# Patient Record
Sex: FEMALE | Race: WHITE | Employment: OTHER | URBAN - METROPOLITAN AREA
[De-identification: names, ages, dates, MRNs, and addresses within clinical notes are randomized per-mention and may not be internally consistent; named-entity substitution may affect disease eponyms.]

---

## 2019-07-01 ENCOUNTER — HOSPITAL ENCOUNTER (INPATIENT)
Age: 74
LOS: 5 days | Discharge: NURSING, REHAB OR LONG-TERM CARE | DRG: 194 | End: 2019-07-06
Attending: EMERGENCY MEDICINE | Admitting: HOSPITALIST
Payer: COMMERCIAL

## 2019-07-01 ENCOUNTER — APPOINTMENT (OUTPATIENT)
Dept: GENERAL RADIOLOGY | Age: 74
DRG: 194 | End: 2019-07-01
Attending: EMERGENCY MEDICINE
Payer: COMMERCIAL

## 2019-07-01 DIAGNOSIS — R09.02 HYPOXIA: ICD-10-CM

## 2019-07-01 DIAGNOSIS — J44.1 COPD EXACERBATION (HCC): ICD-10-CM

## 2019-07-01 DIAGNOSIS — R77.8 ELEVATED TROPONIN: ICD-10-CM

## 2019-07-01 DIAGNOSIS — I50.9 ACUTE ON CHRONIC CONGESTIVE HEART FAILURE, UNSPECIFIED HEART FAILURE TYPE (HCC): Primary | ICD-10-CM

## 2019-07-01 PROBLEM — Z86.73 HISTORY OF CVA (CEREBROVASCULAR ACCIDENT): Status: ACTIVE | Noted: 2019-07-01

## 2019-07-01 PROBLEM — K21.9 GERD (GASTROESOPHAGEAL REFLUX DISEASE): Status: ACTIVE | Noted: 2019-07-01

## 2019-07-01 PROBLEM — G40.909 SEIZURE DISORDER (HCC): Status: ACTIVE | Noted: 2019-07-01

## 2019-07-01 PROBLEM — I50.43 ACUTE ON CHRONIC COMBINED SYSTOLIC AND DIASTOLIC CHF, NYHA CLASS 4 (HCC): Status: ACTIVE | Noted: 2019-07-01

## 2019-07-01 PROBLEM — Z95.0 PACEMAKER: Status: ACTIVE | Noted: 2019-07-01

## 2019-07-01 LAB
ALBUMIN SERPL-MCNC: 3.3 G/DL (ref 3.4–5)
ALBUMIN/GLOB SERPL: 1.1 {RATIO} (ref 0.8–1.7)
ALP SERPL-CCNC: 105 U/L (ref 45–117)
ALT SERPL-CCNC: 21 U/L (ref 13–56)
ANION GAP SERPL CALC-SCNC: 7 MMOL/L (ref 3–18)
APPEARANCE UR: ABNORMAL
AST SERPL-CCNC: 43 U/L (ref 15–37)
ATRIAL RATE: 89 BPM
BACTERIA URNS QL MICRO: ABNORMAL /HPF
BASOPHILS # BLD: 0 K/UL (ref 0–0.1)
BASOPHILS NFR BLD: 0 % (ref 0–2)
BILIRUB SERPL-MCNC: 1.8 MG/DL (ref 0.2–1)
BILIRUB UR QL: NEGATIVE
BNP SERPL-MCNC: ABNORMAL PG/ML (ref 0–900)
BUN SERPL-MCNC: 47 MG/DL (ref 7–18)
BUN/CREAT SERPL: 36 (ref 12–20)
CALCIUM SERPL-MCNC: 9 MG/DL (ref 8.5–10.1)
CALCULATED P AXIS, ECG09: 42 DEGREES
CALCULATED R AXIS, ECG10: 7 DEGREES
CALCULATED T AXIS, ECG11: 79 DEGREES
CHLORIDE SERPL-SCNC: 91 MMOL/L (ref 100–108)
CK MB CFR SERPL CALC: 2.8 % (ref 0–4)
CK MB SERPL-MCNC: 2.1 NG/ML (ref 5–25)
CK SERPL-CCNC: 76 U/L (ref 26–192)
CO2 SERPL-SCNC: 40 MMOL/L (ref 21–32)
COLOR UR: YELLOW
CREAT SERPL-MCNC: 1.3 MG/DL (ref 0.6–1.3)
DIAGNOSIS, 93000: NORMAL
DIFFERENTIAL METHOD BLD: ABNORMAL
EOSINOPHIL # BLD: 0 K/UL (ref 0–0.4)
EOSINOPHIL NFR BLD: 1 % (ref 0–5)
EPITH CASTS URNS QL MICRO: ABNORMAL /LPF (ref 0–5)
ERYTHROCYTE [DISTWIDTH] IN BLOOD BY AUTOMATED COUNT: 18.3 % (ref 11.6–14.5)
EST. AVERAGE GLUCOSE BLD GHB EST-MCNC: NORMAL MG/DL
GLOBULIN SER CALC-MCNC: 3.1 G/DL (ref 2–4)
GLUCOSE BLD STRIP.AUTO-MCNC: 149 MG/DL (ref 70–110)
GLUCOSE SERPL-MCNC: 78 MG/DL (ref 74–99)
GLUCOSE UR STRIP.AUTO-MCNC: NEGATIVE MG/DL
HBA1C MFR BLD: 4.3 % (ref 4.2–5.6)
HCT VFR BLD AUTO: 30.5 % (ref 35–45)
HGB BLD-MCNC: 9 G/DL (ref 12–16)
HGB UR QL STRIP: ABNORMAL
KETONES UR QL STRIP.AUTO: NEGATIVE MG/DL
LEUKOCYTE ESTERASE UR QL STRIP.AUTO: ABNORMAL
LYMPHOCYTES # BLD: 0.7 K/UL (ref 0.9–3.6)
LYMPHOCYTES NFR BLD: 10 % (ref 21–52)
MCH RBC QN AUTO: 29.9 PG (ref 24–34)
MCHC RBC AUTO-ENTMCNC: 29.5 G/DL (ref 31–37)
MCV RBC AUTO: 101.3 FL (ref 74–97)
MONOCYTES # BLD: 0.7 K/UL (ref 0.05–1.2)
MONOCYTES NFR BLD: 11 % (ref 3–10)
NEUTS SEG # BLD: 5.1 K/UL (ref 1.8–8)
NEUTS SEG NFR BLD: 78 % (ref 40–73)
NITRITE UR QL STRIP.AUTO: NEGATIVE
P-R INTERVAL, ECG05: 188 MS
PH UR STRIP: 7 [PH] (ref 5–8)
PLATELET # BLD AUTO: 134 K/UL (ref 135–420)
PMV BLD AUTO: 10.5 FL (ref 9.2–11.8)
POTASSIUM SERPL-SCNC: 3.7 MMOL/L (ref 3.5–5.5)
PROT SERPL-MCNC: 6.4 G/DL (ref 6.4–8.2)
PROT UR STRIP-MCNC: 30 MG/DL
Q-T INTERVAL, ECG07: 476 MS
QRS DURATION, ECG06: 182 MS
QTC CALCULATION (BEZET), ECG08: 579 MS
RBC # BLD AUTO: 3.01 M/UL (ref 4.2–5.3)
RBC #/AREA URNS HPF: ABNORMAL /HPF (ref 0–5)
SODIUM SERPL-SCNC: 138 MMOL/L (ref 136–145)
SP GR UR REFRACTOMETRY: 1.01 (ref 1–1.03)
TROPONIN I SERPL-MCNC: 0.26 NG/ML (ref 0–0.04)
UROBILINOGEN UR QL STRIP.AUTO: 1 EU/DL (ref 0.2–1)
VENTRICULAR RATE, ECG03: 89 BPM
WBC # BLD AUTO: 6.5 K/UL (ref 4.6–13.2)
WBC URNS QL MICRO: ABNORMAL /HPF (ref 0–5)

## 2019-07-01 PROCEDURE — 74011250637 HC RX REV CODE- 250/637: Performed by: HOSPITALIST

## 2019-07-01 PROCEDURE — 71045 X-RAY EXAM CHEST 1 VIEW: CPT

## 2019-07-01 PROCEDURE — 99285 EMERGENCY DEPT VISIT HI MDM: CPT

## 2019-07-01 PROCEDURE — 93005 ELECTROCARDIOGRAM TRACING: CPT

## 2019-07-01 PROCEDURE — 77030013140 HC MSK NEB VYRM -A

## 2019-07-01 PROCEDURE — 83036 HEMOGLOBIN GLYCOSYLATED A1C: CPT

## 2019-07-01 PROCEDURE — 96375 TX/PRO/DX INJ NEW DRUG ADDON: CPT

## 2019-07-01 PROCEDURE — 74011250636 HC RX REV CODE- 250/636: Performed by: HOSPITALIST

## 2019-07-01 PROCEDURE — 96374 THER/PROPH/DIAG INJ IV PUSH: CPT

## 2019-07-01 PROCEDURE — 94640 AIRWAY INHALATION TREATMENT: CPT

## 2019-07-01 PROCEDURE — 74011250636 HC RX REV CODE- 250/636: Performed by: EMERGENCY MEDICINE

## 2019-07-01 PROCEDURE — 65660000000 HC RM CCU STEPDOWN

## 2019-07-01 PROCEDURE — 83880 ASSAY OF NATRIURETIC PEPTIDE: CPT

## 2019-07-01 PROCEDURE — 81001 URINALYSIS AUTO W/SCOPE: CPT

## 2019-07-01 PROCEDURE — 82962 GLUCOSE BLOOD TEST: CPT

## 2019-07-01 PROCEDURE — 80053 COMPREHEN METABOLIC PANEL: CPT

## 2019-07-01 PROCEDURE — 85025 COMPLETE CBC W/AUTO DIFF WBC: CPT

## 2019-07-01 PROCEDURE — 74011000250 HC RX REV CODE- 250: Performed by: EMERGENCY MEDICINE

## 2019-07-01 PROCEDURE — 82550 ASSAY OF CK (CPK): CPT

## 2019-07-01 RX ORDER — LEVOFLOXACIN 5 MG/ML
500 INJECTION, SOLUTION INTRAVENOUS EVERY 24 HOURS
Status: DISCONTINUED | OUTPATIENT
Start: 2019-07-01 | End: 2019-07-06 | Stop reason: HOSPADM

## 2019-07-01 RX ORDER — SUCRALFATE 1 G/1
TABLET ORAL
Refills: 0 | COMMUNITY
Start: 2019-04-05

## 2019-07-01 RX ORDER — LISINOPRIL 5 MG/1
2.5 TABLET ORAL DAILY
Status: DISCONTINUED | OUTPATIENT
Start: 2019-07-02 | End: 2019-07-05

## 2019-07-01 RX ORDER — IPRATROPIUM BROMIDE AND ALBUTEROL SULFATE 2.5; .5 MG/3ML; MG/3ML
3 SOLUTION RESPIRATORY (INHALATION)
Status: DISCONTINUED | OUTPATIENT
Start: 2019-07-01 | End: 2019-07-05

## 2019-07-01 RX ORDER — GABAPENTIN 300 MG/1
CAPSULE ORAL
Refills: 0 | COMMUNITY
Start: 2019-04-05 | End: 2019-07-06

## 2019-07-01 RX ORDER — ATORVASTATIN CALCIUM 20 MG/1
80 TABLET, FILM COATED ORAL
Status: DISCONTINUED | OUTPATIENT
Start: 2019-07-01 | End: 2019-07-05

## 2019-07-01 RX ORDER — ATORVASTATIN CALCIUM 80 MG/1
TABLET, FILM COATED ORAL
Refills: 0 | COMMUNITY
Start: 2019-04-05 | End: 2019-07-06

## 2019-07-01 RX ORDER — INSULIN LISPRO 100 [IU]/ML
INJECTION, SOLUTION INTRAVENOUS; SUBCUTANEOUS
Status: DISCONTINUED | OUTPATIENT
Start: 2019-07-01 | End: 2019-07-05

## 2019-07-01 RX ORDER — APIXABAN 5 MG/1
TABLET, FILM COATED ORAL
Refills: 0 | COMMUNITY
Start: 2019-04-05 | End: 2019-07-06

## 2019-07-01 RX ORDER — FUROSEMIDE 10 MG/ML
40 INJECTION INTRAMUSCULAR; INTRAVENOUS EVERY 12 HOURS
Status: DISCONTINUED | OUTPATIENT
Start: 2019-07-01 | End: 2019-07-03

## 2019-07-01 RX ORDER — BUMETANIDE 2 MG/1
TABLET ORAL
Refills: 0 | COMMUNITY
Start: 2019-04-05

## 2019-07-01 RX ORDER — CALCIUM CARBONATE 300MG(750)
TABLET,CHEWABLE ORAL
Refills: 0 | COMMUNITY
Start: 2019-04-05 | End: 2019-07-06

## 2019-07-01 RX ORDER — IPRATROPIUM BROMIDE AND ALBUTEROL SULFATE 2.5; .5 MG/3ML; MG/3ML
3 SOLUTION RESPIRATORY (INHALATION)
Status: COMPLETED | OUTPATIENT
Start: 2019-07-01 | End: 2019-07-01

## 2019-07-01 RX ORDER — LISINOPRIL 2.5 MG/1
TABLET ORAL
Refills: 0 | COMMUNITY
Start: 2019-04-05 | End: 2019-07-06

## 2019-07-01 RX ORDER — NITROGLYCERIN 0.4 MG/1
TABLET SUBLINGUAL
Refills: 0 | COMMUNITY
Start: 2019-04-05 | End: 2019-07-06

## 2019-07-01 RX ORDER — ASCORBIC ACID 250 MG
TABLET ORAL
Refills: 0 | COMMUNITY
Start: 2019-04-05 | End: 2019-07-06

## 2019-07-01 RX ORDER — GUAIFENESIN 100 MG/5ML
81 LIQUID (ML) ORAL DAILY
Status: DISCONTINUED | OUTPATIENT
Start: 2019-07-02 | End: 2019-07-05

## 2019-07-01 RX ORDER — FUROSEMIDE 10 MG/ML
40 INJECTION INTRAMUSCULAR; INTRAVENOUS EVERY 12 HOURS
Status: DISCONTINUED | OUTPATIENT
Start: 2019-07-01 | End: 2019-07-01

## 2019-07-01 RX ORDER — CARVEDILOL 3.12 MG/1
TABLET ORAL
Refills: 0 | COMMUNITY
Start: 2019-04-05 | End: 2019-07-06

## 2019-07-01 RX ORDER — LANOLIN ALCOHOL/MO/W.PET/CERES
400 CREAM (GRAM) TOPICAL 2 TIMES DAILY
Status: DISCONTINUED | OUTPATIENT
Start: 2019-07-01 | End: 2019-07-05

## 2019-07-01 RX ORDER — LEVETIRACETAM 750 MG/1
TABLET ORAL
Refills: 0 | COMMUNITY
Start: 2019-04-05

## 2019-07-01 RX ORDER — FUROSEMIDE 10 MG/ML
40 INJECTION INTRAMUSCULAR; INTRAVENOUS
Status: COMPLETED | OUTPATIENT
Start: 2019-07-01 | End: 2019-07-01

## 2019-07-01 RX ORDER — FUROSEMIDE 20 MG/1
TABLET ORAL
Refills: 0 | COMMUNITY
Start: 2019-04-05 | End: 2019-07-06

## 2019-07-01 RX ORDER — SUCRALFATE 1 G/1
1 TABLET ORAL DAILY
Status: DISCONTINUED | OUTPATIENT
Start: 2019-07-02 | End: 2019-07-05

## 2019-07-01 RX ORDER — GABAPENTIN 300 MG/1
300 CAPSULE ORAL 3 TIMES DAILY
Status: DISCONTINUED | OUTPATIENT
Start: 2019-07-01 | End: 2019-07-05

## 2019-07-01 RX ORDER — MAGNESIUM SULFATE 100 %
4 CRYSTALS MISCELLANEOUS AS NEEDED
Status: DISCONTINUED | OUTPATIENT
Start: 2019-07-01 | End: 2019-07-05

## 2019-07-01 RX ORDER — CARVEDILOL 3.12 MG/1
3.12 TABLET ORAL 2 TIMES DAILY WITH MEALS
Status: DISCONTINUED | OUTPATIENT
Start: 2019-07-02 | End: 2019-07-05

## 2019-07-01 RX ORDER — GUAIFENESIN 100 MG/5ML
LIQUID (ML) ORAL
Refills: 0 | COMMUNITY
Start: 2019-04-05

## 2019-07-01 RX ADMIN — FUROSEMIDE 40 MG: 10 INJECTION, SOLUTION INTRAMUSCULAR; INTRAVENOUS at 22:37

## 2019-07-01 RX ADMIN — METHYLPREDNISOLONE SODIUM SUCCINATE 40 MG: 40 INJECTION, POWDER, FOR SOLUTION INTRAMUSCULAR; INTRAVENOUS at 22:26

## 2019-07-01 RX ADMIN — APIXABAN 5 MG: 5 TABLET, FILM COATED ORAL at 22:26

## 2019-07-01 RX ADMIN — Medication 400 MG: at 22:25

## 2019-07-01 RX ADMIN — METHYLPREDNISOLONE SODIUM SUCCINATE 125 MG: 125 INJECTION, POWDER, FOR SOLUTION INTRAMUSCULAR; INTRAVENOUS at 14:03

## 2019-07-01 RX ADMIN — ATORVASTATIN CALCIUM 80 MG: 20 TABLET, FILM COATED ORAL at 22:25

## 2019-07-01 RX ADMIN — LEVOFLOXACIN 500 MG: 5 INJECTION, SOLUTION INTRAVENOUS at 19:08

## 2019-07-01 RX ADMIN — FUROSEMIDE 40 MG: 10 INJECTION, SOLUTION INTRAMUSCULAR; INTRAVENOUS at 14:07

## 2019-07-01 RX ADMIN — IPRATROPIUM BROMIDE AND ALBUTEROL SULFATE 3 ML: .5; 3 SOLUTION RESPIRATORY (INHALATION) at 13:10

## 2019-07-01 RX ADMIN — LEVETIRACETAM 750 MG: 500 TABLET ORAL at 22:25

## 2019-07-01 RX ADMIN — GABAPENTIN 300 MG: 300 CAPSULE ORAL at 22:25

## 2019-07-01 NOTE — PROGRESS NOTES
1832: Patient care received. Patient alert and oriented x 4. Patient resting in bed. Patient stable. Call light with in reach bed in lowest position.

## 2019-07-01 NOTE — ED PROVIDER NOTES
EMERGENCY DEPARTMENT HISTORY AND PHYSICAL EXAM    Date: 7/1/2019  Patient Name: Mau Torres    History of Presenting Illness     Chief Complaint   Patient presents with    Respiratory Distress         History Provided By: Patient    Additional History (Context):   Mau Torres is a 69 y.o. female with PMHX oxygen dependent COPD/CHF on chronic 2 L of oxygen at home presents to the emergency department via EMS c/O shortness of breath and chest pain. EMS reports that the patient became short of breath after lunch. Had her oxygen checked at her living's facility noted that she was 73% on 2 L. Placed on nonrebreather. Saturating in the low 90s after NRB. Given 1 DuoNeb prior to arrival.  Denies recent illness including fever, chills, abdominal pain, cough, and any other sxs or complaints. Reports some improvement after being placed on oxygen. PCP: No primary care provider on file. Current Facility-Administered Medications   Medication Dose Route Frequency Provider Last Rate Last Dose    methylPREDNISolone (PF) (Solu-MEDROL) injection 125 mg  125 mg IntraVENous NOW AgustiaBlanquita Koo Sat, DO        furosemide (LASIX) injection 40 mg  40 mg IntraVENous NOW Blanquita Li Sat, DO           Past History     Past Medical History:  No past medical history on file. Past Surgical History:  No past surgical history on file. Family History:  No family history on file. Social History:  Social History     Tobacco Use    Smoking status: Not on file   Substance Use Topics    Alcohol use: Not on file    Drug use: Not on file       Allergies:  No Known Allergies      Review of Systems   Review of Systems   Constitutional: Negative for chills and fever. HENT: Negative for congestion, ear pain, sinus pain and sore throat. Eyes: Negative for pain and visual disturbance. Respiratory: Positive for shortness of breath. Negative for cough. Cardiovascular: Positive for chest pain.  Negative for leg swelling. Gastrointestinal: Negative for abdominal pain, constipation, diarrhea, nausea and vomiting. Genitourinary: Negative for dysuria, hematuria, vaginal bleeding and vaginal discharge. Musculoskeletal: Negative for back pain and neck pain. Skin: Negative for rash and wound. Neurological: Negative for dizziness, tremors, weakness, light-headedness and numbness. All other systems reviewed and are negative.       Physical Exam     Vitals:    07/01/19 1259 07/01/19 1310   BP: 110/71    Pulse: 91    Resp: (!) 32    Temp: 98.9 °F (37.2 °C)    SpO2: 96% (P) 99%   Weight: 64.9 kg (143 lb)    Height: 5' 9\" (1.753 m)      Physical Exam    Nursing note and vitals reviewed    Constitutional: Elderly -American female, moderate respiratory distress  Head: Normocephalic, Atraumatic  Eyes: Pupils are equal, round, and reactive to light, EOMI  Neck: Supple, non-tender  Cardiovascular: Regular rate and rhythm, no murmurs, rubs, or gallops  Chest: Normal work of breathing and chest excursion bilaterally  Lungs: Diminished breath sounds bilaterally with bibasilar crackles  Abdomen: Soft, non tender, non distended, normoactive bowel sounds  Back: No evidence of trauma or deformity  Extremities: No evidence of trauma or deformity, +4 pitting edema bilaterally from her feet up to her calves  Skin: Warm and dry, normal cap refill  Neuro: Alert and appropriate, CN intact, normal speech, moving all 4 extremities freely and symmetrically  Psychiatric: Normal mood and affect       Diagnostic Study Results     Labs -     Recent Results (from the past 12 hour(s))   EKG, 12 LEAD, INITIAL    Collection Time: 07/01/19  1:04 PM   Result Value Ref Range    Ventricular Rate 89 BPM    Atrial Rate 89 BPM    P-R Interval 188 ms    QRS Duration 182 ms    Q-T Interval 476 ms    QTC Calculation (Bezet) 579 ms    Calculated P Axis 42 degrees    Calculated R Axis 7 degrees    Calculated T Axis 79 degrees    Diagnosis       Sinus rhythm with fusion complexes  Left bundle branch block  Abnormal ECG  No previous ECGs available         Radiologic Studies -   XR CHEST PORT    (Results Pending)     CT Results  (Last 48 hours)    None        CXR Results  (Last 48 hours)    None            Medical Decision Making   I am the first provider for this patient. I reviewed the vital signs, available nursing notes, past medical history, past surgical history, family history and social history. Vital Signs-Reviewed the patient's vital signs. Pulse Oximetry Analysis -99 % on 8 L    Cardiac Monitor:  Rate: 91 bpm  Rhythm: Regular    EKG interpretation: (Preliminary)  1:13 PM   Sinus rhythm at 89 bpm.  Left bundle branch block. No acute ST elevation. Records Reviewed: Nursing Notes and Old Medical Records    Provider Notes:   69 y.o. female on chronic 2 L of oxygen, history of CHF and COPD presenting in respiratory distress. Arrived on nonrebreather. Saturating well after receiving DuoNeb. On exam patient is 99% on 8 L. Diminished breath sounds with bibasilar crackles. Will eval for possible CHF exacerbation and obtain BNP and chest x-ray. We will also evaluate for ACS and obtain cardiac enzymes and EKG. Will give duo nebs, Lasix Solu-Medrol and reassess. Will likely require admission. Procedures:  Procedures    ED Course:   1:13 PM   Initial assessment performed. The patients presenting problems have been discussed, and they are in agreement with the care plan formulated and outlined with them. I have encouraged them to ask questions as they arise throughout their visit. 3:35 PM patient's BNP 34,000. Troponin elevated 0.26. Chest x-ray showing vascular congestion. Likely troponin elevated secondary to CHF exacerbation. Significantly elevated discussed patient's history, exam, and available diagnostics results with Dr. Corinne Lama cardiology, who agree with emergent echo, admission.           Diagnosis and Disposition     3:36 PM  I have spent 40 minutes of critical care time involved in lab review, consultations with specialist, family decision-making, and documentation. During this entire length of time I was immediately available to the patient. Critical Care: The reason for providing this level of medical care for this critically ill patient was due a critical illness that impaired one or more vital organ systems such that there was a high probability of imminent or life threatening deterioration in the patients condition. This care involved high complexity decision making to assess, manipulate, and support vital system functions, to treat this degreee vital organ system failure and to prevent further life threatening deterioration of the patients condition. Core Measures:  For Hospitalized Patients: Telemetry    1. Hospitalization Decision Time:  The decision to hospitalize the patient was made by Jennifer Friedman DO  At 3:37 PM on 7/1/2019    2. Aspirin: Aspirin was not given because the patient did not present with a stroke at the time of their Emergency Department evaluation    3:35 PM  Patient is being admitted to the hospital by Mando Schmid MD. The results of their tests and reasons for their admission have been discussed with them and/or available family. They convey agreement and understanding for the need to be admitted and for their admission diagnosis. CONDITIONS ON ADMISSION:  Sepsis is not present at the time of admission. Pneumonia is not present at the time of admission. MRSA is not present at the time of admission. Wound infection is not present at the time of admission. Pressure Ulcer is not present at the time of admission. CLINICAL IMPRESSION:    1. Acute on chronic congestive heart failure, unspecified heart failure type ([address]    2. COPD exacerbation ([address]    3. Hypoxia    4.  Elevated troponin      ____________________________________     Please note that this dictation was completed with Dragon, the computer voice recognition software. Quite often unanticipated grammatical, syntax, homophones, and other interpretive errors are inadvertently transcribed by the computer software. Please disregard these errors. Please excuse any errors that have escaped final proofreading.

## 2019-07-01 NOTE — ED TRIAGE NOTES
Patient is brought to ED bed 6 via EMS with c/o shortness of breath and CP located across the upper chest bilaterally. Patient was given a treatment en route and was started on 10 L non rebreather. Patient is on 2 L NC at home. Patient also c/o throat pain since this morning and has been unable to eat.

## 2019-07-01 NOTE — ROUTINE PROCESS
TRANSFER - IN REPORT: 
 
Verbal report received from THA Ayala R.N.(name) on Clifford Arellano  being received from ED(unit) for routine progression of care Report consisted of patients Situation, Background, Assessment and  
Recommendations(SBAR). Information from the following report(s) SBAR, Kardex, Intake/Output, MAR, Accordion, Recent Results and Med Rec Status was reviewed with the receiving nurse. Opportunity for questions and clarification was provided. Assessment completed upon patients arrival to unit and care assumed.

## 2019-07-01 NOTE — CONSULTS
Cardiology  Consult Note      Patient: Mar Ross MRN: [id number]  SSN: [id number]    YOB: 1945  Age: 69 y.o. Sex: female    Date of Consultation: 7/1/2019  Referring Physician: Mikael Kuhn  Reason for Consultation: CHF and elevated troponin    HPI : 69 yrs old female with as per past PMHx of oxygen dependent COPD/CHF on chronic 2 L of oxygen at home, CHF as per the patient along with CVA in the past who she says follows Dr Darren Nelson for her cardiology care outside of the hospital  presents to the emergency department via EMS c/O shortness of breath and chest pain. As per the patient she has had SOB and LE for past week which has been progressively getting worse and today she was unable to breath and presented here for further evaluation. Cardiology was called due to Possibly CHF along with elevated troponin. She was given I/V Lasix and neb treatment. During my exam at this patient appears to only slightly SOB and doing better. History reviewed. No pertinent past medical history. History reviewed. No pertinent surgical history. Current Facility-Administered Medications   Medication Dose Route Frequency    furosemide (LASIX) injection 40 mg  40 mg IntraVENous Q12H     Current Outpatient Medications   Medication Sig    ELIQUIS 5 mg tablet take 1 tablet by mouth twice a day    VITAMIN C 250 mg tablet take 1 tablet by mouth daily    aspirin 81 mg chewable tablet chew and swallow 1 tablet by mouth once daily    atorvastatin (LIPITOR) 80 mg tablet take 1 tablet by mouth at bedtime    bumetanide (BUMEX) 2 mg tablet take 2 tablets by mouth daily    carvedilol (COREG) 3.125 mg tablet take 3 tablets by mouth twice a day with meals    furosemide (LASIX) 20 mg tablet take 1 tablet by mouth daily if needed for DYSPNEA OR EDEMA IF WE. ..  (REFER TO PRESCRIPTION NOTES).     gabapentin (NEURONTIN) 300 mg capsule take 1 capsule by mouth every 8 hours    levETIRAcetam (KEPPRA) 750 mg tablet take 1 tablet by mouth twice a day    lisinopril (PRINIVIL, ZESTRIL) 2.5 mg tablet take 1 tablet by mouth once daily    magnesium oxide 400 mg magnesium tab take 1 tablet by mouth twice a day    nitroglycerin (NITROSTAT) 0.4 mg SL tablet place 1 tablet under the tongue if needed every 5 minutes for tamar...  (REFER TO PRESCRIPTION NOTES).  sucralfate (CARAFATE) 1 gram tablet take 1 tablet by mouth daily       Allergies and Intolerances:   No Known Allergies    Family History:   History reviewed. No pertinent family history. Social History:   She  has an unknown smoking status. She has never used smokeless tobacco.  She  reports that she drank alcohol. Review of Systems:     Gen: No fever, chills, malaise, weight loss/gain. Heent: No headache, rhinorrhea, epistaxis, ear pain, hearing loss, sinus pain, neck pain/stiffness, sore throat. Heart: SOB for past week    Resp:  wheezing and shortness of breath. Orthopnea and PND  GI: No nausea, vomiting, diarrhea, constipation, melena or hematochezia. : No urinary obstruction, dysuria or hematuria. Derm: No rash, new skin lesion or pruritis. Musc/skeletal: no bone or joint complains. Vasc: No edema, cyanosis or claudication. Endo: No heat/cold intolerance, no polyuria,polydipsia or polyphagia. Neuro: No unilateral weakness, numbness, tingling. No seizures. Heme: No easy bruising or bleeding.       Physical:   Patient Vitals for the past 6 hrs:   Temp Pulse Resp BP SpO2   07/01/19 1545  85 19 106/65 100 %   07/01/19 1530    106/59    07/01/19 1445    110/63    07/01/19 1430   21 109/67    07/01/19 1415  85 13 111/77    07/01/19 1408  86 26 110/60 94 %   07/01/19 1407  86  110/60    07/01/19 1400   12 112/66 95 %   07/01/19 1345  86 26 114/55 97 %   07/01/19 1330  83 27 106/58 96 %   07/01/19 1315  88 18 109/66 100 %   07/01/19 1310     99 %   07/01/19 1259 98.9 °F (37.2 °C) 91 (!) 32 110/71 96 %         Exam:   General Appearance: Comfortable, not using accessory muscles of respiration. HEENT: WILTON. HEAD: Atraumatic  NECK: No JVD, no thyroidomeglay. CAROTIDS:  LUNGS: Clear bilaterally. HEART: S1+S2 audible,Holosystolic murmur read at the Lt sternal border  ABD: Non-tender, BS Audible    EXT: B/L LE edema 2+  VASCULAR EXAM: Pulses are intact. PSYCHIATRIC EXAM: Mood is appropriate. MUSCULOSKELETAL: Grossly no joint deformity. NEUROLOGICAL: Motor and sensory sytem intact and Cranial nerves II-XII intact.     Review of Data:   LABS:   Lab Results   Component Value Date/Time    WBC 6.5 07/01/2019 01:47 PM    HGB 9.0 (L) 07/01/2019 01:47 PM    HCT 30.5 (L) 07/01/2019 01:47 PM    PLATELET 307 (L) 87/12/0142 01:47 PM     Lab Results   Component Value Date/Time    Sodium 138 07/01/2019 01:47 PM    Potassium 3.7 07/01/2019 01:47 PM    Chloride 91 (L) 07/01/2019 01:47 PM    CO2 40 (H) 07/01/2019 01:47 PM    Glucose 78 07/01/2019 01:47 PM    BUN 47 (H) 07/01/2019 01:47 PM    Creatinine 1.30 07/01/2019 01:47 PM     No results found for: CHOL, CHOLX, CHLST, CHOLV, HDL, LDL, LDLC, DLDLP, TGLX, TRIGL, TRIGP  No results found for: GPT  No results found for: HBA1C, HGBE8, ZUF7AYYK, SKS4YYOZ      Cardiology Procedures:   Results for orders placed or performed during the hospital encounter of 07/01/19   EKG, 12 LEAD, INITIAL   Result Value Ref Range    Ventricular Rate 89 BPM    Atrial Rate 89 BPM    P-R Interval 188 ms    QRS Duration 182 ms    Q-T Interval 476 ms    QTC Calculation (Bezet) 579 ms    Calculated P Axis 42 degrees    Calculated R Axis 7 degrees    Calculated T Axis 79 degrees    Diagnosis       Poor data quality, interpretation may be adversely affected  Sinus rhythm with fusion complexes  Left bundle branch block  Abnormal ECG  Confirmed by Isaac Junior MD, Williams Villagran ([id number]) on 7/1/2019 1:38:23 PM             Impression / Plan:    Patient Active Problem List   Diagnosis Code    Elevated troponin R74.8    COPD exacerbation ([address] J44.1    Acute exacerbation of CHF (congestive heart failure) ([facility]) I50.9    Acute on chronic combined systolic and diastolic CHF, NYHA class 4 ([facility]) I50.43    Seizure disorder (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 COR-pulmonale and RHF : Likely secondary to long lasting h/o COPD and TR which was heard on auscultation and possible Pulm. HTN. Patient on the ekg does have biartial enlargement. Agree with Diuresing with I/V Lasix 40mg bid. Strict I/O and follow electrolytes and replete. Will also  Recommend Echo to evaluate LV and RV function. Agree with continuous oxygen. Elevated troponin : Likely secondary to CHF . Recommend serial Troponin. No CP at this time. Will follow closely and only if significant jump would recommend anticoagulation. Patient is already on eliquis. Reason unknown. Will review echo when done and then make further recommendation if further evaluation is needed. Would recommend Holding eliquis and putting her on Lovenox if Cr ok , in case she will need any further procedure. Would recommend trying to get her records from her primary cardiologist.          Signed By: Patrice Gonzalez MD     July 1, 2019

## 2019-07-01 NOTE — ED NOTES
TRANSFER - OUT REPORT:    Verbal report given to Raiza (name) on Barbara Peña  being transferred to Telemetry(unit) for routine progression of care       Report consisted of patients Situation, Background, Assessment and   Recommendations(SBAR). Information from the following report(s) SBAR was reviewed with the receiving nurse. Lines:   Venous Access Device Power port 07/01/19 Upper chest (subclavicular area, right (Active)        Opportunity for questions and clarification was provided.       Patient transported with:   O2 @ 2 liters   Monitor

## 2019-07-02 ENCOUNTER — APPOINTMENT (OUTPATIENT)
Dept: NON INVASIVE DIAGNOSTICS | Age: 74
DRG: 194 | End: 2019-07-02
Attending: EMERGENCY MEDICINE
Payer: COMMERCIAL

## 2019-07-02 LAB
ANION GAP SERPL CALC-SCNC: 6 MMOL/L (ref 3–18)
BUN SERPL-MCNC: 49 MG/DL (ref 7–18)
BUN/CREAT SERPL: 33 (ref 12–20)
CALCIUM SERPL-MCNC: 8.8 MG/DL (ref 8.5–10.1)
CHLORIDE SERPL-SCNC: 90 MMOL/L (ref 100–108)
CK MB CFR SERPL CALC: 2.8 % (ref 0–4)
CK MB CFR SERPL CALC: 3 % (ref 0–4)
CK MB SERPL-MCNC: 2.2 NG/ML (ref 5–25)
CK MB SERPL-MCNC: 2.4 NG/ML (ref 5–25)
CK SERPL-CCNC: 78 U/L (ref 26–192)
CK SERPL-CCNC: 79 U/L (ref 26–192)
CO2 SERPL-SCNC: 42 MMOL/L (ref 21–32)
CREAT SERPL-MCNC: 1.5 MG/DL (ref 0.6–1.3)
ECHO AO ASC DIAM: 3.25 CM
ECHO AO ROOT DIAM: 3.36 CM
ECHO AV AREA PEAK VELOCITY: 3 CM2
ECHO AV AREA VTI: 2.8 CM2
ECHO AV AREA/BSA PEAK VELOCITY: 1.7 CM2/M2
ECHO AV AREA/BSA VTI: 1.6 CM2/M2
ECHO AV MEAN GRADIENT: 1.5 MMHG
ECHO AV PEAK GRADIENT: 2.9 MMHG
ECHO AV PEAK VELOCITY: 85.1 CM/S
ECHO AV REGURGITANT PHT: 254.4 CM
ECHO AV VTI: 13.5 CM
ECHO IVC PROX: 2.1 CM
ECHO LA MAJOR AXIS: 4.46 CM
ECHO LA VOL 2C: 93.49 ML (ref 22–52)
ECHO LA VOL 4C: 59.71 ML (ref 22–52)
ECHO LA VOL BP: 83.37 ML (ref 22–52)
ECHO LA VOL/BSA BIPLANE: 38.46 ML/M2 (ref 16–28)
ECHO LA VOLUME INDEX A2C: 43.13 ML/M2 (ref 16–28)
ECHO LA VOLUME INDEX A4C: 27.55 ML/M2 (ref 16–28)
ECHO LV E' LATERAL VELOCITY: 8 CM/S
ECHO LV E' SEPTAL VELOCITY: 4 CM/S
ECHO LV EDV A2C: 368.4 ML
ECHO LV EDV A4C: 256.9 ML
ECHO LV EDV BP: 317.7 ML (ref 56–104)
ECHO LV EDV INDEX A4C: 118.5 ML/M2
ECHO LV EDV INDEX BP: 146.6 ML/M2
ECHO LV EDV NDEX A2C: 170 ML/M2
ECHO LV EJECTION FRACTION A2C: 24 %
ECHO LV EJECTION FRACTION A4C: 18 %
ECHO LV EJECTION FRACTION BIPLANE: 21 % (ref 55–100)
ECHO LV ESV A2C: 281.4 ML
ECHO LV ESV A4C: 211 ML
ECHO LV ESV BP: 250.9 ML (ref 19–49)
ECHO LV ESV INDEX A2C: 129.8 ML/M2
ECHO LV ESV INDEX A4C: 97.3 ML/M2
ECHO LV ESV INDEX BP: 115.8 ML/M2
ECHO LV GLOBAL CIRCUMFRENTIAL STRAIN (GCS): -1 %
ECHO LV INTERNAL DIMENSION DIASTOLIC: 7.82 CM (ref 3.9–5.3)
ECHO LV INTERNAL DIMENSION SYSTOLIC: 7.51 CM
ECHO LV IVSD: 0.83 CM (ref 0.6–0.9)
ECHO LV MASS 2D: 470.6 G (ref 67–162)
ECHO LV MASS INDEX 2D: 217.1 G/M2 (ref 43–95)
ECHO LV POSTERIOR WALL DIASTOLIC: 1.15 CM (ref 0.6–0.9)
ECHO LVOT DIAM: 1.93 CM
ECHO LVOT PEAK GRADIENT: 3 MMHG
ECHO LVOT PEAK VELOCITY: 86.64 CM/S
ECHO LVOT VTI: 13.15 CM
ECHO MV A VELOCITY: 7.3 CM/S
ECHO MV AREA PHT: 6.1 CM2
ECHO MV E DECELERATION TIME (DT): 124.5 MS
ECHO MV E POINT SEPTAL SEPARATION (EPSS): 31.9 CM
ECHO MV E VELOCITY: 132.52 CM/S
ECHO MV E/A RATIO: 18.15
ECHO MV E/E' LATERAL: 16.57
ECHO MV E/E' RATIO (AVERAGED): 24.85
ECHO MV E/E' SEPTAL: 33.13
ECHO MV PRESSURE HALF TIME (PHT): 36.1 MS
ECHO PULMONARY ARTERY SYSTOLIC PRESSURE (PASP): 75 MMHG
ECHO RA AREA 4C: 27.24 CM2
ECHO RV INTERNAL DIMENSION: 5.19 CM
ECHO TRICUSPID ANNULAR PEAK SYSTOLIC VELOCITY: 1.9 CM/S
ECHO TV REGURGITANT MAX VELOCITY: 384.72 CM/S
ECHO TV REGURGITANT PEAK GRADIENT: 59.2 MMHG
ERYTHROCYTE [DISTWIDTH] IN BLOOD BY AUTOMATED COUNT: 18.7 % (ref 11.6–14.5)
GLUCOSE BLD STRIP.AUTO-MCNC: 132 MG/DL (ref 70–110)
GLUCOSE BLD STRIP.AUTO-MCNC: 139 MG/DL (ref 70–110)
GLUCOSE BLD STRIP.AUTO-MCNC: 154 MG/DL (ref 70–110)
GLUCOSE BLD STRIP.AUTO-MCNC: 196 MG/DL (ref 70–110)
GLUCOSE SERPL-MCNC: 113 MG/DL (ref 74–99)
HCT VFR BLD AUTO: 32.5 % (ref 35–45)
HGB BLD-MCNC: 9.7 G/DL (ref 12–16)
MCH RBC QN AUTO: 30.5 PG (ref 24–34)
MCHC RBC AUTO-ENTMCNC: 29.8 G/DL (ref 31–37)
MCV RBC AUTO: 102.2 FL (ref 74–97)
PISA AR MAX VEL: 354.93 CM/S
PLATELET # BLD AUTO: 153 K/UL (ref 135–420)
PMV BLD AUTO: 11.2 FL (ref 9.2–11.8)
POTASSIUM SERPL-SCNC: 4 MMOL/L (ref 3.5–5.5)
RBC # BLD AUTO: 3.18 M/UL (ref 4.2–5.3)
SODIUM SERPL-SCNC: 138 MMOL/L (ref 136–145)
TROPONIN I SERPL-MCNC: 0.6 NG/ML (ref 0–0.04)
TROPONIN I SERPL-MCNC: 0.77 NG/ML (ref 0–0.04)
WBC # BLD AUTO: 4.6 K/UL (ref 4.6–13.2)

## 2019-07-02 PROCEDURE — 82962 GLUCOSE BLOOD TEST: CPT

## 2019-07-02 PROCEDURE — 65660000000 HC RM CCU STEPDOWN

## 2019-07-02 PROCEDURE — 82550 ASSAY OF CK (CPK): CPT

## 2019-07-02 PROCEDURE — 80048 BASIC METABOLIC PNL TOTAL CA: CPT

## 2019-07-02 PROCEDURE — 77010033678 HC OXYGEN DAILY

## 2019-07-02 PROCEDURE — 36415 COLL VENOUS BLD VENIPUNCTURE: CPT

## 2019-07-02 PROCEDURE — 74011250637 HC RX REV CODE- 250/637: Performed by: HOSPITALIST

## 2019-07-02 PROCEDURE — 74011250636 HC RX REV CODE- 250/636: Performed by: HOSPITALIST

## 2019-07-02 PROCEDURE — 85027 COMPLETE CBC AUTOMATED: CPT

## 2019-07-02 PROCEDURE — 77030011256 HC DRSG MEPILEX <16IN NO BORD MOLN -A

## 2019-07-02 PROCEDURE — 74011636637 HC RX REV CODE- 636/637: Performed by: HOSPITALIST

## 2019-07-02 PROCEDURE — 74011250636 HC RX REV CODE- 250/636: Performed by: INTERNAL MEDICINE

## 2019-07-02 PROCEDURE — C8929 TTE W OR WO FOL WCON,DOPPLER: HCPCS

## 2019-07-02 PROCEDURE — 76450000000

## 2019-07-02 RX ORDER — MILRINONE LACTATE 0.2 MG/ML
0.2 INJECTION, SOLUTION INTRAVENOUS CONTINUOUS
Status: DISCONTINUED | OUTPATIENT
Start: 2019-07-02 | End: 2019-07-05

## 2019-07-02 RX ORDER — LEVOFLOXACIN 500 MG/1
500 TABLET, FILM COATED ORAL ONCE
Status: COMPLETED | OUTPATIENT
Start: 2019-07-02 | End: 2019-07-02

## 2019-07-02 RX ADMIN — GABAPENTIN 300 MG: 300 CAPSULE ORAL at 22:01

## 2019-07-02 RX ADMIN — METHYLPREDNISOLONE SODIUM SUCCINATE 40 MG: 40 INJECTION, POWDER, FOR SOLUTION INTRAMUSCULAR; INTRAVENOUS at 18:38

## 2019-07-02 RX ADMIN — GABAPENTIN 300 MG: 300 CAPSULE ORAL at 18:36

## 2019-07-02 RX ADMIN — PERFLUTREN 1 ML: 6.52 INJECTION, SUSPENSION INTRAVENOUS at 11:14

## 2019-07-02 RX ADMIN — CARVEDILOL 3.12 MG: 3.12 TABLET, FILM COATED ORAL at 18:36

## 2019-07-02 RX ADMIN — Medication 400 MG: at 08:17

## 2019-07-02 RX ADMIN — APIXABAN 5 MG: 5 TABLET, FILM COATED ORAL at 22:01

## 2019-07-02 RX ADMIN — ATORVASTATIN CALCIUM 80 MG: 20 TABLET, FILM COATED ORAL at 22:01

## 2019-07-02 RX ADMIN — GABAPENTIN 300 MG: 300 CAPSULE ORAL at 08:17

## 2019-07-02 RX ADMIN — SUCRALFATE 1 G: 1 TABLET ORAL at 08:45

## 2019-07-02 RX ADMIN — LEVETIRACETAM 750 MG: 500 TABLET ORAL at 22:01

## 2019-07-02 RX ADMIN — METHYLPREDNISOLONE SODIUM SUCCINATE 40 MG: 40 INJECTION, POWDER, FOR SOLUTION INTRAMUSCULAR; INTRAVENOUS at 22:01

## 2019-07-02 RX ADMIN — FUROSEMIDE 40 MG: 10 INJECTION, SOLUTION INTRAMUSCULAR; INTRAVENOUS at 08:50

## 2019-07-02 RX ADMIN — ASPIRIN 81 MG 81 MG: 81 TABLET ORAL at 08:45

## 2019-07-02 RX ADMIN — APIXABAN 5 MG: 5 TABLET, FILM COATED ORAL at 08:17

## 2019-07-02 RX ADMIN — MILRINONE LACTATE IN DEXTROSE 0.2 MCG/KG/MIN: 200 INJECTION, SOLUTION INTRAVENOUS at 14:49

## 2019-07-02 RX ADMIN — LISINOPRIL 2.5 MG: 5 TABLET ORAL at 08:44

## 2019-07-02 RX ADMIN — LEVETIRACETAM 750 MG: 500 TABLET ORAL at 08:17

## 2019-07-02 RX ADMIN — CARVEDILOL 3.12 MG: 3.12 TABLET, FILM COATED ORAL at 08:17

## 2019-07-02 RX ADMIN — FUROSEMIDE 40 MG: 10 INJECTION, SOLUTION INTRAMUSCULAR; INTRAVENOUS at 22:01

## 2019-07-02 RX ADMIN — Medication 400 MG: at 22:01

## 2019-07-02 RX ADMIN — METHYLPREDNISOLONE SODIUM SUCCINATE 40 MG: 40 INJECTION, POWDER, FOR SOLUTION INTRAMUSCULAR; INTRAVENOUS at 09:00

## 2019-07-02 RX ADMIN — METHYLPREDNISOLONE SODIUM SUCCINATE 40 MG: 40 INJECTION, POWDER, FOR SOLUTION INTRAMUSCULAR; INTRAVENOUS at 04:42

## 2019-07-02 RX ADMIN — INSULIN LISPRO 2 UNITS: 100 INJECTION, SOLUTION INTRAVENOUS; SUBCUTANEOUS at 22:14

## 2019-07-02 RX ADMIN — LEVOFLOXACIN 500 MG: 500 TABLET, FILM COATED ORAL at 18:36

## 2019-07-02 NOTE — PROGRESS NOTES
Cardiology Progress Note        Patient: Barbara Peña        Sex: female          DOA: 7/1/2019  YOB: 1945      Age:  69 y.o.        LOS:  LOS: 1 day   Assessment/Plan   Patient Feeling better than yesterday but  Increased Cr today after I/V Diuresis  Patient Active Problem List   Diagnosis Code    Elevated troponin R74.8    COPD exacerbation ([facility]) J44.1    Acute exacerbation of CHF (congestive heart failure) ([facility]) I50.9    Acute on chronic combined systolic and diastolic CHF, NYHA class 4 ([facility]) I50.43    Se[facility]ure disorder ([facility] G40.909    History of CVA (cerebrovascular accident) Z80.78    GERD (gastroesophageal reflux disease) K21.9    Pacemaker Z95.0        Plan:  69 yrs old female with h/o CHF /COPD here with acute decompensated CHF. Acute decompensated CHF NYHA Class IV Stage D HF : Patient is feeling better with I/V Diuresis. Presently continues to be in low flow state with severely reduced LV function with LVEF = 15-20% with severe TR and Severe Pulmonary hypertension by the echo done today which I have reviewed. Would recommend Starting I/V Milrinone at 0.2mcg/kg/min and continue I/V Diuresis . Strict I/O. Recommend Palliative care consult to discuss with patient an family for long term care and poor prognosis and possible home hospice to make her comfortable. COPD : Breathing is better continue treatment as per the hospitalist service. Subjective:     Patient is on nasal cannula at this time and is feeling slightly better. Still has lower extremity swelling      REVIEW OF SYSTEMS:     General: No fevers or chills. Cardiovascular: No chest pain,No palpitations, No orthopnea, No PND, No leg swelling, No claudication  Pulmonary: No shortness of breath.    Gastrointestinal: No nausea, vomiting, bleeding  Neurology: No Dizziness    Objective:      Visit Vitals  /69   Pulse 81   Temp 97 °F (36.1 °C)   Resp 18   Ht 5' 9\" (1.753 m) Wt 101.7 kg (224 lb 4.8 oz)   SpO2 90%   BMI 33.12 kg/m²     Body mass index is 33.12 kg/m². Physical Exam:  General Appearance: Comfortable, not using accessory muscles of respiration. HEENT: WILTON. HEAD: Atraumatic  NECK:JVD present , no thyroidomeglay. CAROTIDS:  LUNGS: Clear bilaterally. HEART: S1+S2 audible,holosystolic murmur at the Lt sternal border, no pericardial rub. ABD: Non-tender, BS Audible    EXT: Edema +2, and no cyanosis. VASCULAR EXAM: Pulses are intact. PSYCHIATRIC EXAM: Mood is appropriate. MUSCULOSKELETAL: Grossly no joint deformity. NEUROLOGICAL: No motor and sensory deficit, Cranial nerves II-XII intact.   Medication:  Current Facility-Administered Medications   Medication Dose Route Frequency    perflutren lipid microspheres (DEFINITY) in NS bolus IV  1 mL IntraVENous RAD ONCE    aspirin chewable tablet 81 mg  81 mg Oral DAILY    atorvastatin (LIPITOR) tablet 80 mg  80 mg Oral QHS    carvedilol (COREG) tablet 3.125 mg  3.125 mg Oral BID WITH MEALS    apixaban (ELIQUIS) tablet 5 mg  5 mg Oral BID    gabapentin (NEURONTIN) capsule 300 mg  300 mg Oral TID    levETIRAcetam (KEPPRA) tablet 750 mg  750 mg Oral BID    lisinopril (PRINIVIL, ZESTRIL) tablet 2.5 mg  2.5 mg Oral DAILY    magnesium oxide (MAG-OX) tablet 400 mg  400 mg Oral BID    sucralfate (CARAFATE) tablet 1 g  1 g Oral DAILY    furosemide (LASIX) injection 40 mg  40 mg IntraVENous Q12H    methylPREDNISolone (PF) (SOLU-MEDROL) injection 40 mg  40 mg IntraVENous Q6H    insulin lispro (HUMALOG) injection   SubCUTAneous AC&HS    glucose chewable tablet 16 g  4 Tab Oral PRN    glucagon (GLUCAGEN) injection 1 mg  1 mg IntraMUSCular PRN    levoFLOXacin (LEVAQUIN) 500 mg in D5W IVPB  500 mg IntraVENous Q24H    albuterol-ipratropium (DUO-NEB) 2.5 MG-0.5 MG/3 ML  3 mL Nebulization Q4H PRN               Lab/Data Reviewed:       Recent Labs     07/02/19  0140 07/01/19  1347   WBC 4.6 6.5   HGB 9.7* 9.0*   HCT 32.5* 30.5*    134*     Recent Labs     07/02/19  0140 07/01/19  1347    138   K 4.0 3.7   CL 90* 91*   CO2 42* 40*   * 78   BUN 49* 47*   CREA 1.50* 1.30   CA 8.8 9.0       Signed By: Dejon Mancini MD     July 2, 2019

## 2019-07-02 NOTE — PROGRESS NOTES
12 - Kardex review reveals order for q6 CE's ordered 7/1 1617. Note last draw [phone] with elevated troponin of .26. Line draw completed at this time. Pt c/o HA pain. Currently no PRN order for pain mgmt. Will request new order. 6370 - critical lab value reported via tech. CO2 = 42. Baseline lab obtained upon admission 7/1 1347 = 40. Also note that troponin has further elevated to .77.    0300 - CO2 and troponin values reported to hospitalist on call, Sarkis Dennis. MD Also report pt c/o HA pain and request PRN pain med. No new orders rec'd at this time but MD states that w/r/t troponins she will defer to cardiology. W/r/t request for pain med, will review chart and enter orders. Will follow.

## 2019-07-02 NOTE — PROGRESS NOTES
Palliative Medicine  [facility]: [phone]  [facility]: [phone] ([phone])    Palliative consult to assist with goals of care for a patient with CHF. Palliative team, NP Donna Deng, KARI Rothman and this MSW met with patient at bedside. No family was present. Mrs. Mariusz lBue was alert and awake sitting up in bed wearing nasal cannula. She shared feeling slightly better today. She shared she came to hospital from [facility]. She was able to state she was at [facility], who the president was but thought the year was 2020 and understood the basics that her heart was poorly functioning. Patient became tearful toward end of short visit, wiping eyes with tissue. Questioned how we could assist her and what was concerning her; Mrs. Mariusz Blue didn't answer verbally only shook head \"no\". At this time patient is FULL code and aggressive care. No AMD on file. We will revisit when ted Santos is available to have full goals of care conversation. Attempted to contact ted Santos via phone at [phone]. She didn't answer call and unable to leave  due to  box not setup. Thank you for the opportunity to assist in the care of Mrs. Mariusz Blue.    Tha Brand MSW  Palliative Medicine

## 2019-07-02 NOTE — PROGRESS NOTES
Hospitalist Progress Note    Patient: Michelle Diaz MRN: [id number]  CSN: [id number]    YOB: 1945  Age: 69 y.o. Sex: female    DOA: 7/1/2019 LOS:  LOS: 1 day          Chief Complaint:    SOB    Assessment/Plan     1. Acute on chronic combined CHF  2. COPD exacerbation  3. Acute hypoxic respiratory failure  4. Hypertension  5. GERD  6. Hx of pacemaker  7. Hx of CVA  8. Chronic debility and weakness  9. Paroxysmal atrial fibrillation    1. Echocardiogram has been completed. Discussed case today with Dr Sun Silveira, cardiology, who recommended continued Lasix with addition of milrinone gtt. Order has been placed by cardiology. Palliative care consult has been placed as LVEF is <15% with grade 3 diastolic dysfunction. Patient remains on supplemental oxygen and states she feels better today. Elevated troponin likely secondary to demand ischemia, no CP during exam.   2. Continue supplemental oxygen, IV steroids, and empiric antibiotics. Breathing treatments as needed. Per patient, respirations have improved today. 3. Improving, continue current measures. 4. BP stable, continue to monitor with routine vitals. 5. Continue carafate. 6. Cardiology on board and following. 7. No changes. 8. PT/OT. 9. Sinus rhythm during exam this AM. No changes. DVT prophylaxis with Eliquis. Disposition: Await palliative care consult and further recommendations from cardiology. Patient Active Problem List   Diagnosis Code    Elevated troponin R74.8    COPD exacerbation ([facility]) J44.1    Acute exacerbation of CHF (congestive heart failure) ([facility]) I50.9    Acute on chronic combined systolic and diastolic CHF, NYHA class 4 ([facility]) I50.43    Seizure disorder ([facility] [address] G40.909    History of CVA (cerebrovascular accident) Z80.78    GERD (gastroesophageal reflux disease) K21.9    Pacemaker Z95.0       Subjective:    Didn't sleep well last night. No complaints.      Review of systems:    Constitutional: denies fevers, chills, myalgias  Respiratory: +/- SOB, -cough  Cardiovascular: denies chest pain, palpitations  Gastrointestinal: denies nausea, vomiting, diarrhea      Vital signs/Intake and Output:  Visit Vitals  /62   Pulse 81   Temp 97.3 °F (36.3 °C)   Resp 18   Ht 5' 9\" (1.753 m)   Wt 101.6 kg (224 lb)   SpO2 95%   BMI 33.08 kg/m²     Current Shift:  No intake/output data recorded. Last three shifts:  06/30 1901 - 07/02 0700  In: -   Out: 250 [Urine:250]    Exam:    General: Elderly, chronically ill appearing AA female, alert, NAD, OX3  Head/Neck: NCAT, supple, No masses, No lymphadenopathy  CVS:Regular rate and rhythm, no M/R/G, S1/S2 heard, no thrill  Lungs:Diminished, coarse lung sounds bilaterally with faint expiratory wheezing bilaterally. Abdomen: Soft, Nontender, No distention, Normal Bowel sounds, No hepatomegaly  Extremities: 2+ Edema BLE. No cyanosis. Skin:normal texture and turgor, no rashes, no lesions  Neuro:grossly normal , follows commands  Psych:appropriate                Labs: Results:       Chemistry Recent Labs     07/02/19  0140 07/01/19  1347   * 78    138   K 4.0 3.7   CL 90* 91*   CO2 42* 40*   BUN 49* 47*   CREA 1.50* 1.30   CA 8.8 9.0   AGAP 6 7   BUCR 33* 36*   AP  --  105   TP  --  6.4   ALB  --  3.3*   GLOB  --  3.1   AGRAT  --  1.1      CBC w/Diff Recent Labs     07/02/19  0140 07/01/19  1347   WBC 4.6 6.5   RBC 3.18* 3.01*   HGB 9.7* 9.0*   HCT 32.5* 30.5*    134*   GRANS  --  78*   LYMPH  --  10*   EOS  --  1      Cardiac Enzymes Recent Labs     07/02/19  0851 07/02/19  0140   CPK 78 79   CKND1 2.8 3.0      Coagulation No results for input(s): PTP, INR, APTT in the last 72 hours. No lab exists for component: INREXT    Lipid Panel No results found for: CHOL, CHOLPOCT, CHOLX, CHLST, CHOLV, 035443, HDL, LDL, LDLC, DLDLP, 696366, VLDLC, VLDL, TGLX, TRIGL, TRIGP, TGLPOCT, CHHD, CHHDX   BNP No results for input(s): BNPP in the last 72 hours.    Liver Enzymes Recent Labs     07/01/19  1347   TP 6.4   ALB 3.3*      SGOT 43*      Thyroid Studies No results found for: T4, T3U, TSH, TSHEXT     Procedures/imaging: see electronic medical records for all procedures/Xrays and details which were not copied into this note but were reviewed prior to creation of [address] Howard, PA-C

## 2019-07-02 NOTE — H&P
[facility]  HISTORY AND PHYSICAL    Name:  Reshma Raymundo  MR#:   [id number]  :  1945  ACCOUNT #:  [de-identified]  ADMIT DATE:  2019    ADMITTING DIAGNOSES:  1. Acute-on-chronic combined systolic and diastolic congestive heart failure. 2.  Peripheral edema. 3.  Acute hypoxic respiratory insufficiency. 4.  Chronic obstructive pulmonary disease with acute exacerbation. 5.  Elevated troponin. HISTORY OF PRESENT ILLNESS:  The patient is a 79-year-old [place] American female with a past medical history of oxygen-dependent COPD and CHF on chronic 2 L of oxygen, is a nursing home. She was sent via EMS to the emergency room with shortness of breath, noted to be hypoxic at 73% on 2 L. After a non-rebreather was placed, she was satting in the low 90s. She was given a neb treatment and she felt better after being placed on oxygen. PAST MEDICAL HISTORY:  Notable for acute-on-chronic combined systolic and diastolic heart failure, polyneuropathy, pacemaker implant, orthostatic hypotension, coronary disease, hyperlipidemia, GERD, history of stroke, seizure disorder, COPD, high blood pressure, gait abnormality, paroxysmal AFib, generalized muscle weakness. PRIMARY CARE PHYSICIAN:  Dr. Jean-Pierre Dave, she is from [facility] Cir:  Her meds that she is on include Eliquis, aspirin, Lipitor, gabapentin, Keppra nitro, sucralfate, vitamin C, magnesium, Aldactone, oxycodone for leg pain, Coreg, Bumex,  midodrine, and promethazine as needed. SOCIAL HISTORY:  She is not . History of tobacco use, we do not know the details on it. Currently living in a facility. No family or surgical history on file and she is not able to tell me that. REVIEW OF SYSTEMS:  CONSTITUTIONAL:  She denies any current fevers or chills. RESPIRATORY:  She had shortness of breath when she got into the emergency room, but that feels better now per her report.   CARDIOVASCULAR:  She describes chest heaviness that is now better. No chest pain. She has worsening leg swelling recently. GASTROINTESTINAL:  She denies any nausea, vomiting or diarrhea. GENITOURINARY:  She denies dysuria, hematuria. MUSCULOSKELETAL:  No new myalgias or arthralgias. NEUROLOGIC:  She is not ambulatory to experience dizziness. She denies that at rest.  She denies any new paresthesias, tremors or headaches. PHYSICAL EXAMINATION:  GENERAL:  On examination, this is a chronically debilitated appearing elderly  female who is pleasant. Her speech is somewhat difficult to understand likely due to her prior stroke, but she seems to be engaged and cooperative. VITAL SIGNS:  Her temperature is 98.9, pulse 85, blood pressure 106/65, respiratory rate 19, SaO2 is 100% now on 2 L. HEENT:  Sclerae mildly icteric. Conjunctivae pale. NECK:  Her neck is supple. No JVD noted. LUNGS:  Diminished breath sounds at the bases but no rales or wheezes auscultated. CARDIAC:  Irregular rhythm, regular rate. No murmur, rub or gallop. ABDOMEN:  Soft, nontender, no distention. Normal bowel sounds. EXTREMITIES:  Lower extremities, 4+ pitting edema to the knees bilaterally. Distal pulses diminished but palpable. The swelling is equal bilaterally of the lower extremities. She appears generally weak overall. LABORATORY DATA:  Her white count is 6.5, H and H 9 and 30.5, platelets are 745. Her BUN is 47, creatinine 1.30. Sodium and potassium are within normal limits. Her LFTs are unremarkable. Her bilirubin was slightly elevated at 1.8. Her troponin is 0.26. Her BNP is 34, 799. Blood glucose was 78. Chest x-ray was consistent with a right IJ MediPort in place and central vascular congestion with bilateral pleural effusions. Her EKG showed sinus rhythm with fusion complexes and left bundle branch block. ASSESSMENT:  1. Acute-on-chronic systolic and diastolic congestive heart failure with exacerbation.   2. Chronic obstructive pulmonary disease exacerbation. 3.  Acute hypoxic respiratory failure, now improved. 4.  Hypertension. 5.  Gastroesophageal reflux disease. 6.  History of pacemaker. 7.  History of stroke. 8.  Chronic debility and weakness. 9.  Paroxysmal atrial fibrillation. PLAN:  I am going to put her on IV Lasix twice a day, going to do Accu-Cheks a.c. and at bedtime. Because of steroids, we will place her on soft cardiac diet. Trend her cardiac markers every six hours for the next three sets. We will give her empiric antibiotics for possible pneumonia, but I think her primary issue is the CHF. Cardiology has been consulted and they are seeing her in the emergency room and have ordered an echocardiogram which we will follow up on. She is going to be admitted to telemetry for continuous cardiac monitoring. We will follow up a basic metabolic panel and CBC in the morning, and I have reconciled her home medications to include her beta blocker, her Eliquis, aspirin, Lipitor, Neurontin, Keppra, Zestril, sucralfate. She does not need any further DVT prophylaxis as she is on chronic Eliquis and we will try to obtain a urinalysis. I am going to put her on Levaquin empirically for possible pneumonia and DuoNeb every four hours as needed for shortness of breath. Continue the diuresis. We will do daily weights, strict I's and O's and anticipate at least 3 days in the hospital.  She will return to the facility which she came from likely on discharge. We will work with case management on that plan when she is clinically improved enough to discharge from the hospital.  I am not going to plan on a duplex of her lower extremities due to the edema, but I think this is all CHF and she is already on an anticoagulant, so it would be unlikely that she has acute DVTs that are causing the edema, I think it is all due to the congestive heart failure.       Jesus Barboza MD      [id number]  D: 07/01/2019 16:38  T:  07/01/2019 16:42  JOB #:  [id number]

## 2019-07-02 NOTE — PROGRESS NOTES
0707:Received verbal bedside report from off going nurse PAUL Hill Patient care received. Patient alert. Patient resting in bed. Patient stable. Call light with in reach bed in lowest position.

## 2019-07-02 NOTE — ROUTINE PROCESS
Bedside and Verbal shift change report given to ANNE Sagastume R.N. (oncoming nurse) by DIEGO Verdugo R.N. (offgoing nurse). Report included the following information SBAR, Kardex, Intake/Output, MAR, Accordion, Recent Results and Med Rec Status.

## 2019-07-02 NOTE — CDMP QUERY
Patient admitted with acute on chronic combined CHF, noted to have elevated troponin. If possible, please document in progress notes and d/c summary if you are evaluating and/or treating any of the following:  Demand ischemia in the setting of CHF and respiratory failure  Other  Unable to Determine The medical record reflects the following: 
  Risk Factors:  0.26,  0.77, 0.60 Clinical indicators: Serial troponins:  
  Treatment: Serial troponins; O2; Cardiology consult If you DECLINE this query or would like to communicate with CDIS, please utilize the \"[facility] message box\" at the TOP of the Progress Note on the right. Thank you, Luly West RN/CCDS 
[phone]

## 2019-07-02 NOTE — PROGRESS NOTES
Reason for Admission:  Elaine Jolly is a 69 y.o. female with PMHX oxygen dependent COPD/CHF on chronic 2 L of oxygen at home presents to the emergency department via EMS c/O shortness of breath and chest pain. EMS reports that the patient became short of breath after lunch. Had her oxygen checked at her living's facility noted that she was 73% on 2 L. Placed on nonrebreather. Saturating in the low 90s after NRB. Given 1 DuoNeb prior to arrival.  Denies recent illness including fever, chills, abdominal pain, cough, and any other sxs or complaints. Reports some improvement after being placed on oxygen.     PCP: No primary care provider on file. RRAT Score:     18             Do you (patient/family) have any concerns for transition/discharge? None at this time              Plan for utilizing home health:   No plans to return to [facility]    Current Advanced Directive/Advance Care Plan:  Not on file. Please consider please consider placing consult to palliative cre or pastoral care to address ACP            Transition of Care Plan:    Met with patient t bedside. States lives with her daughter however, cm spoke wit Peter Connell and patient lives LTC at [facility]. Patient has a UAI that was done on 12/27/2016 by [facility]. Patient has medicare for insurance and patient currently wearing ox she will need walk test. Per Peter Connell patient is a full code no POA and she is LTC under medicaid and she is welcome to return to [facility] for lTC. Cm will continue to [facility] and place referral for gwf    Phil thomson Child   [phone]     Cm has attempted to call number for daughter above however unable to leave message    When patient is ready for d/c see below  Transition of care to SNF: [facility],[address] Floor     Communication to Patient/Family:  Met with patient and family, and they are agreeable to the transition plan.      SNF/Rehab Transition:  Patient has been accepted to [address] [place] [address] [address] and meets criteria for admission. Patient will transported by TBD and expected to leave at tbd    Communication to SNF/Rehab:  Bedside RN,aminata, has been notified to update the transition plan to the facility and call report [phone]    Discharge information has been updated on the AVS and sent via [facility] and/or CC link. Discharge instructions to be fax'd to facility at [phone]     Please include all hard scripts for controlled substances, med rec and dc summary, and AVS in packet. Please medicate for pain prior to dc if possible and needed to help offset delay when patient first arrives to facility. Reviewed and confirmed with facility, [facility], can manage the patient care needs for the following:     Edu Gipson with (X) only those applicable:  Medication:  []Medications are available at the facility  []IV Antibiotics    []Controlled Substance  hard copies available sent. []Weekly Labs    Equipment:  []CPAP/BiPAP  []Wound Vacuum  []Hemphill or Urinary Device  []PICC/Central Line  []Nebulizer  []Ventilator    Treatment:  []Isolation (for MRSA, VRE, etc.)  []Surgical Drain Management  []Tracheostomy Care  []Dressing Changes  []Dialysis with transportation  []PEG Care  []Oxygen  []Daily Weights for Heart Failure    Dietary:  []Any diet limitations  []Tube Feedings   []Total Parenteral Management (TPN)    Financial Resources:  []Medicaid Application Completed  [x]UAI Completed  []Level II Completed  [] Private pay individual who will not become financially eligible for Medicaid within 6 months from admission to a [address] [place] [address] facility. [] Individual refused to have screening conducted. Was completed at [facility] on 2010 uai   Advanced Care Plan:  []Surrogate Decision Maker of Care  []POA  []Communicated Code Status and copy sent.     Other:

## 2019-07-03 LAB
ANION GAP SERPL CALC-SCNC: 5 MMOL/L (ref 3–18)
BUN SERPL-MCNC: 51 MG/DL (ref 7–18)
BUN/CREAT SERPL: 37 (ref 12–20)
CALCIUM SERPL-MCNC: 8.2 MG/DL (ref 8.5–10.1)
CHLORIDE SERPL-SCNC: 92 MMOL/L (ref 100–108)
CO2 SERPL-SCNC: 42 MMOL/L (ref 21–32)
CREAT SERPL-MCNC: 1.38 MG/DL (ref 0.6–1.3)
ERYTHROCYTE [DISTWIDTH] IN BLOOD BY AUTOMATED COUNT: 18.2 % (ref 11.6–14.5)
GLUCOSE BLD STRIP.AUTO-MCNC: 135 MG/DL (ref 70–110)
GLUCOSE BLD STRIP.AUTO-MCNC: 153 MG/DL (ref 70–110)
GLUCOSE BLD STRIP.AUTO-MCNC: 164 MG/DL (ref 70–110)
GLUCOSE BLD STRIP.AUTO-MCNC: 172 MG/DL (ref 70–110)
GLUCOSE SERPL-MCNC: 136 MG/DL (ref 74–99)
HCT VFR BLD AUTO: 27.2 % (ref 35–45)
HGB BLD-MCNC: 8.2 G/DL (ref 12–16)
MCH RBC QN AUTO: 30.3 PG (ref 24–34)
MCHC RBC AUTO-ENTMCNC: 30.1 G/DL (ref 31–37)
MCV RBC AUTO: 100.4 FL (ref 74–97)
PLATELET # BLD AUTO: 128 K/UL (ref 135–420)
PMV BLD AUTO: 10.1 FL (ref 9.2–11.8)
POTASSIUM SERPL-SCNC: 3.5 MMOL/L (ref 3.5–5.5)
RBC # BLD AUTO: 2.71 M/UL (ref 4.2–5.3)
SODIUM SERPL-SCNC: 139 MMOL/L (ref 136–145)
WBC # BLD AUTO: 9.8 K/UL (ref 4.6–13.2)

## 2019-07-03 PROCEDURE — 80048 BASIC METABOLIC PNL TOTAL CA: CPT

## 2019-07-03 PROCEDURE — 74011636637 HC RX REV CODE- 636/637: Performed by: HOSPITALIST

## 2019-07-03 PROCEDURE — 82962 GLUCOSE BLOOD TEST: CPT

## 2019-07-03 PROCEDURE — 74011250637 HC RX REV CODE- 250/637: Performed by: HOSPITALIST

## 2019-07-03 PROCEDURE — 74011250636 HC RX REV CODE- 250/636: Performed by: INTERNAL MEDICINE

## 2019-07-03 PROCEDURE — 74011250636 HC RX REV CODE- 250/636: Performed by: HOSPITALIST

## 2019-07-03 PROCEDURE — 36415 COLL VENOUS BLD VENIPUNCTURE: CPT

## 2019-07-03 PROCEDURE — 77010033678 HC OXYGEN DAILY

## 2019-07-03 PROCEDURE — 65660000000 HC RM CCU STEPDOWN

## 2019-07-03 PROCEDURE — 85027 COMPLETE CBC AUTOMATED: CPT

## 2019-07-03 RX ORDER — FUROSEMIDE 10 MG/ML
40 INJECTION INTRAMUSCULAR; INTRAVENOUS DAILY
Status: DISCONTINUED | OUTPATIENT
Start: 2019-07-04 | End: 2019-07-04

## 2019-07-03 RX ADMIN — GABAPENTIN 300 MG: 300 CAPSULE ORAL at 16:35

## 2019-07-03 RX ADMIN — METHYLPREDNISOLONE SODIUM SUCCINATE 40 MG: 40 INJECTION, POWDER, FOR SOLUTION INTRAMUSCULAR; INTRAVENOUS at 10:00

## 2019-07-03 RX ADMIN — FUROSEMIDE 40 MG: 10 INJECTION, SOLUTION INTRAMUSCULAR; INTRAVENOUS at 09:29

## 2019-07-03 RX ADMIN — APIXABAN 5 MG: 5 TABLET, FILM COATED ORAL at 09:29

## 2019-07-03 RX ADMIN — LEVOFLOXACIN 500 MG: 5 INJECTION, SOLUTION INTRAVENOUS at 16:34

## 2019-07-03 RX ADMIN — MILRINONE LACTATE IN DEXTROSE 0.2 MCG/KG/MIN: 200 INJECTION, SOLUTION INTRAVENOUS at 06:37

## 2019-07-03 RX ADMIN — GABAPENTIN 300 MG: 300 CAPSULE ORAL at 09:28

## 2019-07-03 RX ADMIN — INSULIN LISPRO 2 UNITS: 100 INJECTION, SOLUTION INTRAVENOUS; SUBCUTANEOUS at 16:35

## 2019-07-03 RX ADMIN — LISINOPRIL 2.5 MG: 5 TABLET ORAL at 09:29

## 2019-07-03 RX ADMIN — Medication 400 MG: at 09:28

## 2019-07-03 RX ADMIN — GABAPENTIN 300 MG: 300 CAPSULE ORAL at 21:52

## 2019-07-03 RX ADMIN — CARVEDILOL 3.12 MG: 3.12 TABLET, FILM COATED ORAL at 16:35

## 2019-07-03 RX ADMIN — METHYLPREDNISOLONE SODIUM SUCCINATE 40 MG: 40 INJECTION, POWDER, FOR SOLUTION INTRAMUSCULAR; INTRAVENOUS at 05:47

## 2019-07-03 RX ADMIN — INSULIN LISPRO 2 UNITS: 100 INJECTION, SOLUTION INTRAVENOUS; SUBCUTANEOUS at 21:53

## 2019-07-03 RX ADMIN — INSULIN LISPRO 2 UNITS: 100 INJECTION, SOLUTION INTRAVENOUS; SUBCUTANEOUS at 12:57

## 2019-07-03 RX ADMIN — MILRINONE LACTATE IN DEXTROSE 0.2 MCG/KG/MIN: 200 INJECTION, SOLUTION INTRAVENOUS at 19:54

## 2019-07-03 RX ADMIN — LEVETIRACETAM 750 MG: 500 TABLET ORAL at 09:28

## 2019-07-03 RX ADMIN — SUCRALFATE 1 G: 1 TABLET ORAL at 09:28

## 2019-07-03 RX ADMIN — Medication 400 MG: at 21:52

## 2019-07-03 RX ADMIN — APIXABAN 5 MG: 5 TABLET, FILM COATED ORAL at 21:53

## 2019-07-03 RX ADMIN — ATORVASTATIN CALCIUM 80 MG: 20 TABLET, FILM COATED ORAL at 21:53

## 2019-07-03 RX ADMIN — LEVETIRACETAM 750 MG: 500 TABLET ORAL at 21:52

## 2019-07-03 RX ADMIN — CARVEDILOL 3.12 MG: 3.12 TABLET, FILM COATED ORAL at 09:29

## 2019-07-03 RX ADMIN — ASPIRIN 81 MG 81 MG: 81 TABLET ORAL at 09:29

## 2019-07-03 NOTE — PROGRESS NOTES
Transition of care; LTC   Met with patient at bedside. Telephone call with daughter states her phone is working now. States she is POA. berta did ask her to bring a copy in. She does want patient to return to [facility] for LTC when she is ready for d/c. BERTA has contacted Renu White at [facility] she is able to accommodate on Friday  When patient is ready to d/c from [facility]  Transition of care to SNF: [facility]     Communication to Patient/Family:  Met with patient and family, and they are agreeable to the transition plan. SNF/Rehab Transition:  Patient has been accepted to [address] [place] [address],[address] and meets criteria for admission. Patient will transported by tbd and expected to leave at d    Communication to SNF/Rehab:  Bedside RN, aminata has been notified to update the transition plan to the facility and call report [phone]    Discharge information has been updated on the AVS and sent via [facility] and/or CC link. Discharge instructions to be fax'd to facility at [phone]     Please include all hard scripts for controlled substances, med rec and dc summary, and AVS in packet. Please medicate for pain prior to dc if possible and needed to help offset delay when patient first arrives to facility. Reviewed and confirmed with facility, [facility] , can manage the patient care needs for the following:     Kaye Ridley with (X) only those applicable:  Medication:  [x]Medications are available at the facility  []IV Antibiotics    []Controlled Substance  hard copies available sent.   []Weekly Labs    Equipment:  []CPAP/BiPAP  []Wound Vacuum  []Hemphill or Urinary Device  []PICC/Central Line  []Nebulizer  []Ventilator    Treatment:  []Isolation (for MRSA, VRE, etc.)  []Surgical Drain Management  []Tracheostomy Care  []Dressing Changes  []Dialysis with transportation  []PEG Care  []Oxygen  []Daily Weights for Heart Failure    Dietary:  []Any diet limitations  []Tube Feedings []Total Parenteral Management (TPN)    Financial Resources:  []Medicaid Application Completed  [x]UAI Completed  []Level II Completed  [] Private pay individual who will not become financially eligible for Medicaid within 6 months from admission to a [address] [place] [address] facility. [] Individual refused to have screening conducted. [facility]:  []Surrogate Decision Maker of Care  []POA  []Communicated Code Status and copy sent. Other:         Care Management Interventions  PCP Verified by CM:  Yes  Mode of Transport at Discharge: BLS  Transition of Care Consult (CM Consult): [address] [place] [address]  Current Support Network: Nursing Facility  Confirm Follow Up Transport: Family  Plan discussed with Pt/Family/Caregiver: Yes  Freedom of Choice Offered: Yes  Discharge Location  [facility]

## 2019-07-03 NOTE — PROGRESS NOTES
Cardiology Progress Note        Patient: Zhou Gates        Sex: female          DOA: 7/1/2019  YOB: 1945      Age:  69 y.o.        LOS:  LOS: 2 days   Assessment/Plan   Patient Feeling better than yesterday. Cr better today but increased BiCarb. Patient Active Problem List   Diagnosis Code    Elevated troponin R74.8    COPD exacerbation ([facility]) J44.1    Acute exacerbation of CHF (congestive heart failure) ([facility]) I50.9    Acute on chronic combined systolic and diastolic CHF, NYHA class 4 ([facility]) I50.43    Seizure disorder ([facility] [address] G40.909    History of CVA (cerebrovascular accident) Z80.78    GERD (gastroesophageal reflux disease) K21.9    Pacemaker Z95.0        Plan:  69 yrs old female with h/o CHF /COPD here with acute decompensated CHF. Acute decompensated CHF NYHA Class IV Stage D HF : Patient is feeling better with I/V Diuresis. Presently continues to be in low flow state with severely reduced LV function with LVEF = 15-20% with severe TR and Severe Pulmonary hypertension by the echo done today which I have reviewed. Milrinone was started yestarday. Feeling better. Would stress on accurate charting of her I/Os. Recommend continuing milrinone for another 24 hrs. Decrease Lasix to 40mg once a day. Will continue to monitor closely. Patient does have an AICD or Pacer which has not been interrogated in this admission, She can follow up with Her primary cardiologist for it. Palliative care discussed with the patient but patient doesn't understand the poor prognosis. They will attempt to reach her daughter    COPD : Breathing is better continue treatment as per the hospitalist service. Recommend DC Solumedrol                    Subjective:     Patient is on nasal cannula at this time and is feeling slightly better. Still has lower extremity swelling      REVIEW OF SYSTEMS:     General: No fevers or chills.   Cardiovascular: No chest pain,No palpitations, No orthopnea, No PND, No leg swelling, No claudication  Pulmonary: No shortness of breath. Gastrointestinal: No nausea, vomiting, bleeding  Neurology: No Dizziness    Objective:      Visit Vitals  /51 (BP 1 Location: Left arm, BP Patient Position: At rest)   Pulse 82   Temp 97.8 °F (36.6 °C)   Resp 18   Ht 5' 9\" (1.753 m)   Wt 103 kg (227 lb 1.2 oz)   SpO2 (!) 85%   BMI 33.53 kg/m²     Body mass index is 33.53 kg/m². Physical Exam:  General Appearance: Comfortable, not using accessory muscles of respiration. HEENT: WILTON. HEAD: Atraumatic  NECK:JVD present , no thyroidomeglay. CAROTIDS:  LUNGS: Clear bilaterally. HEART: S1+S2 audible,holosystolic murmur at the Lt sternal border, no pericardial rub. ABD: Non-tender, BS Audible    EXT: Edema +2, and no cyanosis. VASCULAR EXAM: Pulses are intact. PSYCHIATRIC EXAM: Mood is appropriate. MUSCULOSKELETAL: Grossly no joint deformity. NEUROLOGICAL: No motor and sensory deficit, Cranial nerves II-XII intact.   Medication:  Current Facility-Administered Medications   Medication Dose Route Frequency    [START ON 7/4/2019] furosemide (LASIX) injection 40 mg  40 mg IntraVENous DAILY    milrinone (PRIMACOR) 20 MG/100 ML D5W infusion  0.2 mcg/kg/min IntraVENous CONTINUOUS    aspirin chewable tablet 81 mg  81 mg Oral DAILY    atorvastatin (LIPITOR) tablet 80 mg  80 mg Oral QHS    carvedilol (COREG) tablet 3.125 mg  3.125 mg Oral BID WITH MEALS    apixaban (ELIQUIS) tablet 5 mg  5 mg Oral BID    gabapentin (NEURONTIN) capsule 300 mg  300 mg Oral TID    levETIRAcetam (KEPPRA) tablet 750 mg  750 mg Oral BID    lisinopril (PRINIVIL, ZESTRIL) tablet 2.5 mg  2.5 mg Oral DAILY    magnesium oxide (MAG-OX) tablet 400 mg  400 mg Oral BID    sucralfate (CARAFATE) tablet 1 g  1 g Oral DAILY    methylPREDNISolone (PF) (SOLU-MEDROL) injection 40 mg  40 mg IntraVENous Q6H    insulin lispro (HUMALOG) injection   SubCUTAneous AC&HS    glucose chewable tablet 16 g  4 Tab Oral PRN    glucagon (GLUCAGEN) injection 1 mg  1 mg IntraMUSCular PRN    levoFLOXacin (LEVAQUIN) 500 mg in D5W IVPB  500 mg IntraVENous Q24H    albuterol-ipratropium (DUO-NEB) 2.5 MG-0.5 MG/3 ML  3 mL Nebulization Q4H PRN               Lab/Data Reviewed:       Recent Labs     07/03/19  0808 07/02/19  0140 07/01/19  1347   WBC 9.8 4.6 6.5   HGB 8.2* 9.7* 9.0*   HCT 27.2* 32.5* 30.5*   * 153 134*     Recent Labs     07/03/19  0808 07/02/19  0140 07/01/19  1347    138 138   K 3.5 4.0 3.7   CL 92* 90* 91*   CO2 42* 42* 40*   * 113* 78   BUN 51* 49* 47*   CREA 1.38* 1.50* 1.30   CA 8.2* 8.8 9.0       Signed By: Cesar Vaughn MD     July 3, 2019

## 2019-07-03 NOTE — ROUTINE PROCESS
.Bedside and Verbal shift change report given to OTILIA Rai R.N. (oncoming nurse) by IDEGO Verdugo R.N. (offgoing nurse). Report included the following information SBAR, Kardex, Intake/Output, MAR, Accordion, Recent Results and Med Rec Status.

## 2019-07-03 NOTE — PROGRESS NOTES
Hospitalist Progress Note    Patient: Mohsen Ornelas MRN: [id number]  CSN: [id number]    YOB: 1945  Age: 69 y.o. Sex: female    DOA: 7/1/2019 LOS:  LOS: 2 days          Chief Complaint:    CHF Exac    Assessment/Plan     1. Acute on chronic combined CHF  2. COPD exacerbation  3. Acute hypoxic respiratory failure  4. Hypertension  5. GERD  6. Hx of pacemaker  7. Hx of CVA  8. Chronic debility and weakness  9. Paroxysmal atrial fibrillation    1. Patient started on milrinone gtt per cardiology yesterday. Continue to monitor strict I/O. States she is feeling better this AM. Attempted to have palliative discussion with patient this AM with regards to poor prognosis, but it appears patient does not comprehend the nature of her diagnosis. When questioned about code status, patient does not respond, does not engage, and avoids interaction. Attempted to reach daughter, but no answer and voicemail is not set up. 2. Continue supplemental oxygen and empiric antibiotics. Breathing treatments as needed. Per patient, respirations have improved today. Cardiology rec stopping IV steroids. 3. Improving, continue current measures. 4. BP stable, continue to monitor with routine vitals. 5. Continue carafate. 6. Cardiology on board and following. 7. No changes. 8. PT/OT. 9. Sinus rhythm during exam this AM. No changes. Poor prognosis. DVT prophylaxis with Eliquis. Disposition: Continue current measures, will need palliative care discussion with family if family can be reached.      Patient Active Problem List   Diagnosis Code    Elevated troponin R74.8    COPD exacerbation ([facility]) J44.1    Acute exacerbation of CHF (congestive heart failure) ([facility]) I50.9    Acute on chronic combined systolic and diastolic CHF, NYHA class 4 ([facility]) I50.43    Seizure disorder ([facility] [address] G40.909    History of CVA (cerebrovascular accident) Z80.78    GERD (gastroesophageal reflux disease) K21.9    Pacemaker Z95.0 Subjective:    Says she is feeling better this AM.     Review of systems:    Constitutional: denies fevers, chills, myalgias  Respiratory: +SOB, -cough  Cardiovascular: denies chest pain, palpitations  Gastrointestinal: denies nausea, vomiting, diarrhea      Vital signs/Intake and Output:  Visit Vitals  /51 (BP 1 Location: Left arm, BP Patient Position: At rest)   Pulse 82   Temp 97.8 °F (36.6 °C)   Resp 18   Ht 5' 9\" (1.753 m)   Wt 103 kg (227 lb 1.2 oz)   SpO2 (!) 85%   BMI 33.53 kg/m²     Current Shift:  07/03 0701 - 07/03 1900  In: 120 [P.O.:120]  Out: -   Last three shifts:  07/01 1901 - 07/03 0700  In: 600 [P.O.:600]  Out: 550 [Urine:550]    Exam:    General: Elderly, ill appearing AA female, alert, NAD, OX3  Head/Neck: NCAT, supple, No masses, No lymphadenopathy  CVS:Regular rate and rhythm, no M/R/G, S1/S2 heard, no thrill  Lungs: Diminished BS bilaterally. No wheezing. Abdomen: Soft, Nontender, No distention, Normal Bowel sounds, No hepatomegaly  Extremities: Trace BLE edema. Skin:normal texture and turgor, no rashes, no lesions  Neuro:grossly normal , follows commands  Psych:appropriate                Labs: Results:       Chemistry Recent Labs     07/03/19  0808 07/02/19  0140 07/01/19  1347   * 113* 78    138 138   K 3.5 4.0 3.7   CL 92* 90* 91*   CO2 42* 42* 40*   BUN 51* 49* 47*   CREA 1.38* 1.50* 1.30   CA 8.2* 8.8 9.0   AGAP 5 6 7   BUCR 37* 33* 36*   AP  --   --  105   TP  --   --  6.4   ALB  --   --  3.3*   GLOB  --   --  3.1   AGRAT  --   --  1.1      CBC w/Diff Recent Labs     07/03/19  0808 07/02/19  0140 07/01/19  1347   WBC 9.8 4.6 6.5   RBC 2.71* 3.18* 3.01*   HGB 8.2* 9.7* 9.0*   HCT 27.2* 32.5* 30.5*   * 153 134*   GRANS  --   --  78*   LYMPH  --   --  10*   EOS  --   --  1      Cardiac Enzymes Recent Labs     07/02/19  0851 07/02/19  0140   CPK 78 79   CKND1 2.8 3.0      Coagulation No results for input(s): PTP, INR, APTT in the last 72 hours.     No lab exists for component: INREXT    Lipid Panel No results found for: CHOL, CHOLPOCT, CHOLX, CHLST, CHOLV, 528251, HDL, LDL, LDLC, DLDLP, 559794, VLDLC, VLDL, TGLX, TRIGL, TRIGP, TGLPOCT, CHHD, CHHDX   BNP No results for input(s): BNPP in the last 72 hours.    Liver Enzymes Recent Labs     07/01/19  1347   TP 6.4   ALB 3.3*      SGOT 43*      Thyroid Studies No results found for: T4, T3U, TSH, TSHEXT     Procedures/imaging: see electronic medical records for all procedures/Xrays and details which were not copied into this note but were reviewed prior to creation of [address], PA-C

## 2019-07-03 NOTE — ROUTINE PROCESS
Bedside and Verbal shift change report given to [address] (oncoming nurse) by Joana Paredes (offgoing nurse). Report included the following information SBAR, Kardex, Procedure Summary, Intake/Output, MAR, Accordion, Recent Results and Med Rec Status.

## 2019-07-03 NOTE — PROGRESS NOTES
Problem: Falls - Risk of  Goal: *Absence of Falls  Description  Document [facility] Fall Risk and appropriate interventions in the flowsheet. Outcome: Progressing Towards Goal     Problem: Patient Education: Go to Patient Education Activity  Goal: Patient/Family Education  Outcome: Progressing Towards Goal     Problem: Pressure Injury - Risk of  Goal: *Prevention of pressure injury  Description  Document Irineo Scale and appropriate interventions in the flowsheet.   Outcome: Progressing Towards Goal     Problem: Patient Education: Go to Patient Education Activity  Goal: Patient/Family Education  Outcome: Progressing Towards Goal

## 2019-07-03 NOTE — WOUND CARE
Attempted to see patient for assessment of possible wounds. Patient declined and stated she did not feel good at this time. Will attempt at a later time.

## 2019-07-03 NOTE — PROGRESS NOTES
Education provided included:   Medication review including s/s to monitor   Dietary modification (low sodium intake and fluid restrictions)  \"Living with HF\" packet: reviewed stop light, weight tracker, medication schedules, daily weights energy conservation, exercise and rest, and lifestyle modification

## 2019-07-03 NOTE — PROGRESS NOTES
Attempted to reach daughter,Elizabeth, via phone at [phone] to discuss goals of care. No answer, voicemail not set up. Unable to leave message.     Katiana Muse RN

## 2019-07-03 NOTE — PROGRESS NOTES
Occupational Therapy Screening:  Services are not indicated at this time. [facility] was triggered for occupational therapy based on results obtained during the nursing admission assessment. The patients chart was reviewed and the patient is not appropriate for a skilled therapy evaluation at this time. Patient was admitted with SOB/CP. Pt is a LTC resident at [facility] d/t low EF and greatly limited by SOB (on home O2). Please consult occupational therapy if any therapy needs arise. Thank you.   [place], OTR/L

## 2019-07-03 NOTE — PROGRESS NOTES
1925: Assumed patient care from [facility]. Patient is alert and oriented to person, place, time and situation. Respiratory status is stable on 2L via nasal cannula. Vital signs are stable. MEWS score is a one. Patient denies any pain, discomfort, nausea vomiting dizziness or anxiety. White board and fall card is updated. Bed is locked and in lowest position. Call bell, water and personal belongings are within reach. Patient has no questions, comments or concerns after bedside shift report. 0700: Patient had an uneventful shift. Respiratory status, vital signs and MEWS score remained stable. Patient was resting quietly with no signs of distress noted. Bed locked and in lowest position. Call bell water and personal belongings were within reach. Patient had no questions, comments or concerns after bedside shift report.  Bedside report given to [facility].

## 2019-07-04 LAB
ANION GAP SERPL CALC-SCNC: 3 MMOL/L (ref 3–18)
BUN SERPL-MCNC: 46 MG/DL (ref 7–18)
BUN/CREAT SERPL: 37 (ref 12–20)
CALCIUM SERPL-MCNC: 8.2 MG/DL (ref 8.5–10.1)
CHLORIDE SERPL-SCNC: 91 MMOL/L (ref 100–108)
CO2 SERPL-SCNC: 43 MMOL/L (ref 21–32)
CREAT SERPL-MCNC: 1.25 MG/DL (ref 0.6–1.3)
ERYTHROCYTE [DISTWIDTH] IN BLOOD BY AUTOMATED COUNT: 18.1 % (ref 11.6–14.5)
GLUCOSE BLD STRIP.AUTO-MCNC: 114 MG/DL (ref 70–110)
GLUCOSE BLD STRIP.AUTO-MCNC: 130 MG/DL (ref 70–110)
GLUCOSE BLD STRIP.AUTO-MCNC: 151 MG/DL (ref 70–110)
GLUCOSE BLD STRIP.AUTO-MCNC: 190 MG/DL (ref 70–110)
GLUCOSE SERPL-MCNC: 122 MG/DL (ref 74–99)
HCT VFR BLD AUTO: 27.3 % (ref 35–45)
HGB BLD-MCNC: 8.2 G/DL (ref 12–16)
MCH RBC QN AUTO: 29.8 PG (ref 24–34)
MCHC RBC AUTO-ENTMCNC: 30 G/DL (ref 31–37)
MCV RBC AUTO: 99.3 FL (ref 74–97)
PLATELET # BLD AUTO: 130 K/UL (ref 135–420)
PMV BLD AUTO: 10.4 FL (ref 9.2–11.8)
POTASSIUM SERPL-SCNC: 3.1 MMOL/L (ref 3.5–5.5)
POTASSIUM SERPL-SCNC: 3.4 MMOL/L (ref 3.5–5.5)
RBC # BLD AUTO: 2.75 M/UL (ref 4.2–5.3)
SODIUM SERPL-SCNC: 137 MMOL/L (ref 136–145)
WBC # BLD AUTO: 9.7 K/UL (ref 4.6–13.2)

## 2019-07-04 PROCEDURE — 77010033678 HC OXYGEN DAILY

## 2019-07-04 PROCEDURE — 74011250636 HC RX REV CODE- 250/636: Performed by: INTERNAL MEDICINE

## 2019-07-04 PROCEDURE — 74011250637 HC RX REV CODE- 250/637: Performed by: INTERNAL MEDICINE

## 2019-07-04 PROCEDURE — 74011636637 HC RX REV CODE- 636/637: Performed by: HOSPITALIST

## 2019-07-04 PROCEDURE — 74011250636 HC RX REV CODE- 250/636: Performed by: HOSPITALIST

## 2019-07-04 PROCEDURE — 74011250637 HC RX REV CODE- 250/637: Performed by: HOSPITALIST

## 2019-07-04 PROCEDURE — 82962 GLUCOSE BLOOD TEST: CPT

## 2019-07-04 PROCEDURE — 65660000000 HC RM CCU STEPDOWN

## 2019-07-04 PROCEDURE — 85027 COMPLETE CBC AUTOMATED: CPT

## 2019-07-04 PROCEDURE — 74011250637 HC RX REV CODE- 250/637: Performed by: FAMILY MEDICINE

## 2019-07-04 PROCEDURE — 84132 ASSAY OF SERUM POTASSIUM: CPT

## 2019-07-04 PROCEDURE — 36415 COLL VENOUS BLD VENIPUNCTURE: CPT

## 2019-07-04 RX ORDER — POTASSIUM CHLORIDE 1.5 G/1.77G
40 POWDER, FOR SOLUTION ORAL ONCE
Status: COMPLETED | OUTPATIENT
Start: 2019-07-04 | End: 2019-07-04

## 2019-07-04 RX ORDER — FUROSEMIDE 10 MG/ML
40 INJECTION INTRAMUSCULAR; INTRAVENOUS 2 TIMES DAILY
Status: DISCONTINUED | OUTPATIENT
Start: 2019-07-04 | End: 2019-07-04

## 2019-07-04 RX ORDER — FUROSEMIDE 10 MG/ML
40 INJECTION INTRAMUSCULAR; INTRAVENOUS DAILY
Status: DISCONTINUED | OUTPATIENT
Start: 2019-07-05 | End: 2019-07-05

## 2019-07-04 RX ORDER — ACETAZOLAMIDE 250 MG/1
250 TABLET ORAL 2 TIMES DAILY
Status: DISCONTINUED | OUTPATIENT
Start: 2019-07-04 | End: 2019-07-05

## 2019-07-04 RX ORDER — SPIRONOLACTONE 25 MG/1
25 TABLET ORAL
Status: COMPLETED | OUTPATIENT
Start: 2019-07-04 | End: 2019-07-04

## 2019-07-04 RX ADMIN — ATORVASTATIN CALCIUM 80 MG: 20 TABLET, FILM COATED ORAL at 21:48

## 2019-07-04 RX ADMIN — ACETAZOLAMIDE 250 MG: 250 TABLET ORAL at 21:49

## 2019-07-04 RX ADMIN — Medication 400 MG: at 10:16

## 2019-07-04 RX ADMIN — APIXABAN 5 MG: 5 TABLET, FILM COATED ORAL at 10:17

## 2019-07-04 RX ADMIN — INSULIN LISPRO 2 UNITS: 100 INJECTION, SOLUTION INTRAVENOUS; SUBCUTANEOUS at 17:19

## 2019-07-04 RX ADMIN — LEVOFLOXACIN 500 MG: 5 INJECTION, SOLUTION INTRAVENOUS at 16:05

## 2019-07-04 RX ADMIN — LISINOPRIL 2.5 MG: 5 TABLET ORAL at 10:17

## 2019-07-04 RX ADMIN — POTASSIUM CHLORIDE 40 MEQ: 1.5 POWDER, FOR SOLUTION ORAL at 10:16

## 2019-07-04 RX ADMIN — SPIRONOLACTONE 25 MG: 25 TABLET, FILM COATED ORAL at 11:48

## 2019-07-04 RX ADMIN — Medication 400 MG: at 21:49

## 2019-07-04 RX ADMIN — MILRINONE LACTATE IN DEXTROSE 0.2 MCG/KG/MIN: 200 INJECTION, SOLUTION INTRAVENOUS at 11:48

## 2019-07-04 RX ADMIN — CARVEDILOL 3.12 MG: 3.12 TABLET, FILM COATED ORAL at 16:05

## 2019-07-04 RX ADMIN — CARVEDILOL 3.12 MG: 3.12 TABLET, FILM COATED ORAL at 10:17

## 2019-07-04 RX ADMIN — FUROSEMIDE 40 MG: 10 INJECTION, SOLUTION INTRAMUSCULAR; INTRAVENOUS at 10:17

## 2019-07-04 RX ADMIN — LEVETIRACETAM 750 MG: 500 TABLET ORAL at 21:48

## 2019-07-04 RX ADMIN — GABAPENTIN 300 MG: 300 CAPSULE ORAL at 16:05

## 2019-07-04 RX ADMIN — ASPIRIN 81 MG 81 MG: 81 TABLET ORAL at 10:17

## 2019-07-04 RX ADMIN — LEVETIRACETAM 750 MG: 500 TABLET ORAL at 10:16

## 2019-07-04 RX ADMIN — GABAPENTIN 300 MG: 300 CAPSULE ORAL at 10:16

## 2019-07-04 RX ADMIN — GABAPENTIN 300 MG: 300 CAPSULE ORAL at 21:48

## 2019-07-04 RX ADMIN — ACETAZOLAMIDE 250 MG: 250 TABLET ORAL at 16:05

## 2019-07-04 RX ADMIN — APIXABAN 5 MG: 5 TABLET, FILM COATED ORAL at 21:49

## 2019-07-04 RX ADMIN — SUCRALFATE 1 G: 1 TABLET ORAL at 10:16

## 2019-07-04 RX ADMIN — INSULIN LISPRO 2 UNITS: 100 INJECTION, SOLUTION INTRAVENOUS; SUBCUTANEOUS at 06:30

## 2019-07-04 NOTE — ROUTINE PROCESS
Bedside and Verbal shift change report given to Slime Zuniga  RN (oncoming nurse) by Esther John RN (offgoing nurse). Report included the following information SBAR, Kardex, Procedure Summary, Intake/Output, MAR, Accordion, Recent Results and Med Rec Status.

## 2019-07-04 NOTE — PROGRESS NOTES
Hospitalist Progress Note    Patient: Christophe Gamble MRN: [id number]  CSN: [id number]    YOB: 1945  Age: 69 y.o. Sex: female    DOA: 7/1/2019 LOS:  LOS: 3 days          Chief Complaint:    CHF exac    Assessment/Plan     1. Acute on chronic combined CHF  2. COPD exacerbation  3. Acute hypoxic respiratory failure  4. Hypertension  5. GERD  6. Hx of pacemaker  7. Hx of CVA  8. Chronic debility and weakness  9. Paroxysmal atrial fibrillation    1. Continue milrinone gtt, unless otherwise directed by cardiology. Patient still requiring supplemental oxygen, rales to bilateral lungs, and pitting edema to bilateral lower extremities. Attempted to again discuss prognosis and code status with patient, but patient disengaged and would not continue conversation. 2. Resolved, no wheezing on exam. Continue supplemental oxygen - wean as tolerable for patient. 3. Improved, patient still requiring supplemental oxygen via nasal cannula. 4. BP stable, no changes at this time. 5. Continue carafate. 6. No changes. 7. No changes. 8. Patient is long term care patient prior to admission. 9. Stable, in sinus rhythm on monitors with occasional PVCs. DVT prophylaxis with Eliquis. Disposition: Continue milrinone gtt per cardiology. Await further recommendations. Need palliative meeting with daughter involved as patient does not grasp prognosis or degree of HF. Patient Active Problem List   Diagnosis Code    Elevated troponin R74.8    COPD exacerbation ([facility]) J44.1    Acute exacerbation of CHF (congestive heart failure) ([facility]) I50.9    Acute on chronic combined systolic and diastolic CHF, NYHA class 4 ([facility]) I50.43    Seizure disorder ([facility] [address] G40.909    History of CVA (cerebrovascular accident) Z80.78    GERD (gastroesophageal reflux disease) K21.9    Pacemaker Z95.0       Subjective:    Says she is ok this morning. Did not sleep well.     Review of systems:    Constitutional: denies fevers, chills, myalgias  Respiratory:+SOB, -cough  Cardiovascular: denies chest pain, palpitations  Gastrointestinal: denies nausea, vomiting, diarrhea      Vital signs/Intake and Output:  Visit Vitals  /60 (BP 1 Location: Left arm, BP Patient Position: At rest)   Pulse 74   Temp 97.1 °F (36.2 °C)   Resp 18   Ht 5' 9\" (1.753 m)   Wt 104.1 kg (229 lb 8 oz)   SpO2 92%   BMI 33.89 kg/m²     Current Shift:  No intake/output data recorded. Last three shifts:  07/02 1901 - 07/04 0700  In: 1131.7 [P.O.:1080; I.V.:51.7]  Out: 0     Exam:    General: Elderly, chronically ill appearing female, alert, NAD, OX3  Head/Neck: NCAT, supple, No masses, No lymphadenopathy  CVS:Regular rate and rhythm, no M/R/G, S1/S2 heard, no thrill  Lungs:rales bilaterally  Abdomen: Soft, Nontender, No distention, Normal Bowel sounds, No hepatomegaly  Extremities: 3+ pitting edema bilateral lower extremities.   Skin:normal texture and turgor, no rashes, no lesions  Neuro:grossly normal , follows commands  Psych:appropriate                Labs: Results:       Chemistry Recent Labs     07/04/19 0418 07/03/19 0808 07/02/19 0140 07/01/19  1347   * 136* 113* 78    139 138 138   K 3.1* 3.5 4.0 3.7   CL 91* 92* 90* 91*   CO2 43* 42* 42* 40*   BUN 46* 51* 49* 47*   CREA 1.25 1.38* 1.50* 1.30   CA 8.2* 8.2* 8.8 9.0   AGAP 3 5 6 7   BUCR 37* 37* 33* 36*   AP  --   --   --  105   TP  --   --   --  6.4   ALB  --   --   --  3.3*   GLOB  --   --   --  3.1   AGRAT  --   --   --  1.1      CBC w/Diff Recent Labs     07/04/19 0418 07/03/19  0808 07/02/19 0140 07/01/19  1347   WBC 9.7 9.8 4.6 6.5   RBC 2.75* 2.71* 3.18* 3.01*   HGB 8.2* 8.2* 9.7* 9.0*   HCT 27.3* 27.2* 32.5* 30.5*   * 128* 153 134*   GRANS  --   --   --  78*   LYMPH  --   --   --  10*   EOS  --   --   --  1      Cardiac Enzymes Recent Labs     07/02/19  0851 07/02/19  0140   CPK 78 79   CKND1 2.8 3.0      Coagulation No results for input(s): PTP, INR, APTT in the last 72 hours.    No lab exists for component: INREXT    Lipid Panel No results found for: CHOL, CHOLPOCT, CHOLX, CHLST, CHOLV, 279686, HDL, LDL, LDLC, DLDLP, 387715, VLDLC, VLDL, TGLX, TRIGL, TRIGP, TGLPOCT, CHHD, CHHDX   BNP No results for input(s): BNPP in the last 72 hours.    Liver Enzymes Recent Labs     07/01/19  1347   TP 6.4   ALB 3.3*      SGOT 43*      Thyroid Studies No results found for: T4, T3U, TSH, TSHEXT     Procedures/imaging: see electronic medical records for all procedures/Xrays and details which were not copied into this note but were reviewed prior to creation of [address], PAAriasC

## 2019-07-04 NOTE — PROGRESS NOTES
Cardiology Progress Note        Patient: Skip Schmitt        Sex: female          DOA: 7/1/2019  YOB: 1945      Age:  69 y.o.        LOS:  LOS: 3 days   Assessment/Plan   Patient Feeling better than yesterday. Cr better today but increased BiCarb. Patient Active Problem List   Diagnosis Code    Elevated troponin R74.8    COPD exacerbation ([facility]) J44.1    Acute exacerbation of CHF (congestive heart failure) ([facility]) I50.9    Acute on chronic combined systolic and diastolic CHF, NYHA class 4 ([facility]) I50.43    Seizure disorder ([facility] [address] G40.909    History of CVA (cerebrovascular accident) Z80.78    GERD (gastroesophageal reflux disease) K21.9    Pacemaker Z95.0        Plan:  69 yrs old female with h/o CHF /COPD here with acute decompensated CHF. Acute decompensated CHF NYHA Class IV Stage D HF : Patient is feeling better with I/V Diuresis. Presently continues to be in low flow state with severely reduced LV function with LVEF = 15-20% with severe TR and Severe Pulmonary hypertension by the echo done today which I have reviewed. Milrinone was started 2days ago. Agree continue Milrinone today. Will reevaluate tomorrow to determine if ok to come off it . Feeling better. Would stress on accurate charting of her I/Os. Recommend continuing milrinone for another 24 hrs. Continue Lasix to 40mg daily . Recommend Dimox 250mg bid for more diuresis and maintain HCO3. Palliative care discussed with the patient but patient doesn't understand the poor prognosis. They will attempt to reach her daughter    COPD : Breathing is better continue treatment as per the hospitalist service. Recommend DC Solumedrol                    Subjective:     Patient is on nasal cannula at this time and is feeling slightly better. Still has lower extremity swelling      REVIEW OF SYSTEMS:     General: No fevers or chills.   Cardiovascular: No chest pain,No palpitations, No orthopnea, No PND, No leg swelling, No claudication  Pulmonary: No shortness of breath. Gastrointestinal: No nausea, vomiting, bleeding  Neurology: No Dizziness    Objective:      Visit Vitals  /45 (BP 1 Location: Left arm, BP Patient Position: At rest)   Pulse 99   Temp 97.3 °F (36.3 °C)   Resp 18   Ht 5' 9\" (1.753 m)   Wt 104.1 kg (229 lb 8 oz)   SpO2 92%   BMI 33.89 kg/m²     Body mass index is 33.89 kg/m². Physical Exam:  General Appearance: Comfortable, not using accessory muscles of respiration. HEENT: WILTON. HEAD: Atraumatic  NECK:JVD present , no thyroidomeglay. CAROTIDS:  LUNGS: Clear bilaterally. HEART: S1+S2 audible,holosystolic murmur at the Lt sternal border, no pericardial rub. ABD: Non-tender, BS Audible    EXT: Edema +2, and no cyanosis. VASCULAR EXAM: Pulses are intact. PSYCHIATRIC EXAM: Mood is appropriate. MUSCULOSKELETAL: Grossly no joint deformity. NEUROLOGICAL: No motor and sensory deficit, Cranial nerves II-XII intact.   Medication:  Current Facility-Administered Medications   Medication Dose Route Frequency    furosemide (LASIX) injection 40 mg  40 mg IntraVENous DAILY    milrinone (PRIMACOR) 20 MG/100 ML D5W infusion  0.2 mcg/kg/min IntraVENous CONTINUOUS    aspirin chewable tablet 81 mg  81 mg Oral DAILY    atorvastatin (LIPITOR) tablet 80 mg  80 mg Oral QHS    carvedilol (COREG) tablet 3.125 mg  3.125 mg Oral BID WITH MEALS    apixaban (ELIQUIS) tablet 5 mg  5 mg Oral BID    gabapentin (NEURONTIN) capsule 300 mg  300 mg Oral TID    levETIRAcetam (KEPPRA) tablet 750 mg  750 mg Oral BID    lisinopril (PRINIVIL, ZESTRIL) tablet 2.5 mg  2.5 mg Oral DAILY    magnesium oxide (MAG-OX) tablet 400 mg  400 mg Oral BID    sucralfate (CARAFATE) tablet 1 g  1 g Oral DAILY    insulin lispro (HUMALOG) injection   SubCUTAneous AC&HS    glucose chewable tablet 16 g  4 Tab Oral PRN    glucagon (GLUCAGEN) injection 1 mg  1 mg IntraMUSCular PRN    levoFLOXacin (LEVAQUIN) 500 mg in D5W IVPB  500 mg IntraVENous Q24H    albuterol-ipratropium (DUO-NEB) 2.5 MG-0.5 MG/3 ML  3 mL Nebulization Q4H PRN               Lab/Data Reviewed:       Recent Labs     07/04/19 0418 07/03/19  0808 07/02/19  0140   WBC 9.7 9.8 4.6   HGB 8.2* 8.2* 9.7*   HCT 27.3* 27.2* 32.5*   * 128* 153     Recent Labs     07/04/19 0418 07/03/19  0808 07/02/19  0140    139 138   K 3.1* 3.5 4.0   CL 91* 92* 90*   CO2 43* 42* 42*   * 136* 113*   BUN 46* 51* 49*   CREA 1.25 1.38* 1.50*   CA 8.2* 8.2* 8.8       Signed By: Arlette Avery MD     July 4, 2019

## 2019-07-04 NOTE — PROGRESS NOTES
1920 Pt received from offgoing nurse without any signs or symptoms of distress. Pt vitals are stable and within normal limits. Pt bed in low position with wheels locked and call bell within reach. 1956 Assessment completed and documented in flow sheet. Pt denies any further needs at this time. Pt in NAD with bed in low position, wheels locked and call bell within reach. Purposeful rounding completed. Pt resting quietly. No further needs voiced at this time. 2153 Scheduled medications administered as ordered. Purposeful rounding completed. Pt resting quietly. No further needs voiced at this time. 2330 Reassessment completed with no changes noted. Bed locked, in lowest position, with call light within reach. Purposeful rounding completed. Pt resting quietly. No further needs voiced at this time. 0200 Purposeful rounding completed. Pt resting quietly. No further needs voiced at this time. [id number] Reassessment completed with no changes noted. Bed locked, in lowest position, with call light within reach. Purposeful rounding completed. Pt resting quietly. No further needs voiced at this time. 9965 notified by Gurwinderwanda Jerez lab tech that pt had critical CO2 of 43. Page out to Dr Holly Valentin to update on pt status. 0906 Return call received. No new orders at this time. Continue to monitor  0630 Scheduled medications administered as ordered. Purposeful rounding completed. Pt resting quietly. No further needs voiced at this time. 6513 notified Dr Holly Valentin of pt having frequent PVC's and K+ of 3.1 this am. New orders received for K+ 40 meq once and then recheck K+ at noon. 6713 Bedside and Verbal shift change report given to Kaushik Dee RN (oncoming nurse) by José Miguel Jacobs RN (offgoing nurse).  Report included the following information SBAR, MAR and Recent Results. '

## 2019-07-04 NOTE — PROGRESS NOTES
Problem: Falls - Risk of  Goal: *Absence of Falls  Description  Document [facility] Fall Risk and appropriate interventions in the flowsheet. Outcome: Progressing Towards Goal     Problem: Patient Education: Go to Patient Education Activity  Goal: Patient/Family Education  Outcome: Progressing Towards Goal     Problem: Pressure Injury - Risk of  Goal: *Prevention of pressure injury  Description  Document Irineo Scale and appropriate interventions in the flowsheet.   Outcome: Progressing Towards Goal     Problem: Patient Education: Go to Patient Education Activity  Goal: Patient/Family Education  Outcome: Progressing Towards Goal

## 2019-07-05 ENCOUNTER — HOME CARE VISIT (OUTPATIENT)
Dept: HOSPICE | Facility: HOSPICE | Age: 74
End: 2019-07-05

## 2019-07-05 ENCOUNTER — HOSPICE ADMISSION (OUTPATIENT)
Dept: HOSPICE | Facility: HOSPICE | Age: 74
End: 2019-07-05

## 2019-07-05 LAB
ANION GAP SERPL CALC-SCNC: 4 MMOL/L (ref 3–18)
BUN SERPL-MCNC: 42 MG/DL (ref 7–18)
BUN/CREAT SERPL: 34 (ref 12–20)
CALCIUM SERPL-MCNC: 8.3 MG/DL (ref 8.5–10.1)
CHLORIDE SERPL-SCNC: 91 MMOL/L (ref 100–108)
CO2 SERPL-SCNC: 43 MMOL/L (ref 21–32)
CREAT SERPL-MCNC: 1.25 MG/DL (ref 0.6–1.3)
ERYTHROCYTE [DISTWIDTH] IN BLOOD BY AUTOMATED COUNT: 17.9 % (ref 11.6–14.5)
GLUCOSE BLD STRIP.AUTO-MCNC: 104 MG/DL (ref 70–110)
GLUCOSE BLD STRIP.AUTO-MCNC: 105 MG/DL (ref 70–110)
GLUCOSE BLD STRIP.AUTO-MCNC: 105 MG/DL (ref 70–110)
GLUCOSE BLD STRIP.AUTO-MCNC: 129 MG/DL (ref 70–110)
GLUCOSE SERPL-MCNC: 100 MG/DL (ref 74–99)
HCT VFR BLD AUTO: 29.6 % (ref 35–45)
HGB BLD-MCNC: 8.9 G/DL (ref 12–16)
MCH RBC QN AUTO: 29.8 PG (ref 24–34)
MCHC RBC AUTO-ENTMCNC: 30.1 G/DL (ref 31–37)
MCV RBC AUTO: 99 FL (ref 74–97)
PLATELET # BLD AUTO: 140 K/UL (ref 135–420)
PMV BLD AUTO: 10.5 FL (ref 9.2–11.8)
POTASSIUM SERPL-SCNC: 3.3 MMOL/L (ref 3.5–5.5)
RBC # BLD AUTO: 2.99 M/UL (ref 4.2–5.3)
SODIUM SERPL-SCNC: 138 MMOL/L (ref 136–145)
WBC # BLD AUTO: 7.7 K/UL (ref 4.6–13.2)

## 2019-07-05 PROCEDURE — 65660000000 HC RM CCU STEPDOWN

## 2019-07-05 PROCEDURE — 74011250636 HC RX REV CODE- 250/636: Performed by: HOSPITALIST

## 2019-07-05 PROCEDURE — 77010033678 HC OXYGEN DAILY

## 2019-07-05 PROCEDURE — 74011250636 HC RX REV CODE- 250/636: Performed by: INTERNAL MEDICINE

## 2019-07-05 PROCEDURE — 74011250637 HC RX REV CODE- 250/637: Performed by: PHYSICIAN ASSISTANT

## 2019-07-05 PROCEDURE — 74011250637 HC RX REV CODE- 250/637: Performed by: HOSPITALIST

## 2019-07-05 PROCEDURE — 80048 BASIC METABOLIC PNL TOTAL CA: CPT

## 2019-07-05 PROCEDURE — 74011250637 HC RX REV CODE- 250/637: Performed by: INTERNAL MEDICINE

## 2019-07-05 PROCEDURE — 82962 GLUCOSE BLOOD TEST: CPT

## 2019-07-05 PROCEDURE — 85027 COMPLETE CBC AUTOMATED: CPT

## 2019-07-05 RX ORDER — POTASSIUM CHLORIDE 20 MEQ/1
40 TABLET, EXTENDED RELEASE ORAL
Status: COMPLETED | OUTPATIENT
Start: 2019-07-05 | End: 2019-07-05

## 2019-07-05 RX ORDER — MORPHINE SULFATE ORAL SOLUTION 10 MG/5ML
5 SOLUTION ORAL
Qty: 100 ML | Refills: 0 | Status: SHIPPED | OUTPATIENT
Start: 2019-07-05 | End: 2019-07-08

## 2019-07-05 RX ORDER — SPIRONOLACTONE 25 MG/1
25 TABLET ORAL DAILY
Status: DISCONTINUED | OUTPATIENT
Start: 2019-07-05 | End: 2019-07-05

## 2019-07-05 RX ORDER — LORAZEPAM 2 MG/ML
1 CONCENTRATE ORAL
Qty: 30 ML | Refills: 0 | Status: SHIPPED | OUTPATIENT
Start: 2019-07-05

## 2019-07-05 RX ORDER — LORAZEPAM 2 MG/ML
1 CONCENTRATE ORAL
Status: DISCONTINUED | OUTPATIENT
Start: 2019-07-05 | End: 2019-07-06 | Stop reason: HOSPADM

## 2019-07-05 RX ORDER — MORPHINE SULFATE ORAL SOLUTION 10 MG/5ML
5 SOLUTION ORAL
Status: DISCONTINUED | OUTPATIENT
Start: 2019-07-05 | End: 2019-07-06 | Stop reason: HOSPADM

## 2019-07-05 RX ADMIN — LISINOPRIL 2.5 MG: 5 TABLET ORAL at 08:46

## 2019-07-05 RX ADMIN — CARVEDILOL 3.12 MG: 3.12 TABLET, FILM COATED ORAL at 08:45

## 2019-07-05 RX ADMIN — APIXABAN 5 MG: 5 TABLET, FILM COATED ORAL at 08:45

## 2019-07-05 RX ADMIN — Medication 400 MG: at 08:45

## 2019-07-05 RX ADMIN — SUCRALFATE 1 G: 1 TABLET ORAL at 08:45

## 2019-07-05 RX ADMIN — MILRINONE LACTATE IN DEXTROSE 0.2 MCG/KG/MIN: 200 INJECTION, SOLUTION INTRAVENOUS at 04:27

## 2019-07-05 RX ADMIN — ASPIRIN 81 MG 81 MG: 81 TABLET ORAL at 08:45

## 2019-07-05 RX ADMIN — POTASSIUM CHLORIDE 40 MEQ: 20 TABLET, EXTENDED RELEASE ORAL at 13:07

## 2019-07-05 RX ADMIN — LEVETIRACETAM 750 MG: 500 TABLET ORAL at 08:45

## 2019-07-05 RX ADMIN — GABAPENTIN 300 MG: 300 CAPSULE ORAL at 08:45

## 2019-07-05 RX ADMIN — FUROSEMIDE 40 MG: 10 INJECTION, SOLUTION INTRAMUSCULAR; INTRAVENOUS at 08:46

## 2019-07-05 RX ADMIN — ACETAZOLAMIDE 250 MG: 250 TABLET ORAL at 08:45

## 2019-07-05 RX ADMIN — SPIRONOLACTONE 25 MG: 25 TABLET, FILM COATED ORAL at 13:07

## 2019-07-05 RX ADMIN — LEVOFLOXACIN 500 MG: 5 INJECTION, SOLUTION INTRAVENOUS at 17:43

## 2019-07-05 NOTE — PROGRESS NOTES
DC Plan: Discharge to [facility] for LTC with hospice    Chart reviewed. Per chart pt and family have had palliative meeting and decided on comfort care. Pt is a LTC resident of [address] [place] [address]. Met with pt and multiple family members at bedside. Given hospice list. They verbalized understanding that pt could dc back to [facility]. This CM spoke with Claudette Perla @ [facility] and she states pt can be accepted back tomorrow. CM will cont to follow.

## 2019-07-05 NOTE — PROGRESS NOTES
Hospitalist Progress Note    Patient: Nesha Solis MRN: [id number]  CSN: [id number]    YOB: 1945  Age: 69 y.o. Sex: female    DOA: 7/1/2019 LOS:  LOS: 4 days          Chief Complaint:    Acute CHF     Assessment/Plan     1. Acute on chronic combined CHF  2. COPD exacerbation  3. Acute hypoxic respiratory failure  4. Hypertension  5. GERD  6. Hx of pacemaker  7. Hx of CVA  8. Chronic debility and weakness  9. Paroxysmal atrial fibrillation    Family meeting held today with patient's daughter and granddaughter. Discussed patient's diagnosis of systolic congestive heart failure, and discussed severity to include severely reduced EF of <15%. Discussed overall poor prognosis for patient. Patient is long term care at [facility]. Discussed hospice care with patient's family. Per patient's daughter, she feels that the patient would like to focus on comfort and to avoid any further pain. With her severely reduced ejection fraction of <15%, the patient qualifies for hospice. Hospice services were explained to the patient's family, who are in agreement with proceeding with hospice to promote the best quality of life possible for the patient in whatever remaining time is left. Patient's family was tearful during discussion, but understanding of explanation regarding prognosis. Patient's family stated they do not wish to have patient undergo chest compressions, \"shocks\", or intubation. A durable DO NOT RESUSCITATE has been completed and signed. A POST has also been completed and signed. Discharge plan is to return to the [address] [place] [address],[address] under hospice care. Total time 35 minutes with >50% of time counseling and coordinating care.      Patient Active Problem List   Diagnosis Code    Elevated troponin R74.8    COPD exacerbation ([facility]) J44.1    Acute exacerbation of CHF (congestive heart failure) ([facility]) I50.9    Acute on chronic combined systolic and diastolic CHF, NYHA class 4 ([facility] I50.43    Seizure disorder ([facility] [address] G40.909    History of CVA (cerebrovascular accident) Z80.78    GERD (gastroesophageal reflux disease) K21.9    Pacemaker Z95.0       Subjective:    Patient states she is feeling well. Review of systems:    Constitutional: denies fevers, chills, myalgias  Respiratory: +/-SOB, cough  Cardiovascular: denies chest pain, palpitations  Gastrointestinal: denies nausea, vomiting, diarrhea      Vital signs/Intake and Output:  Visit Vitals  BP (!) 89/72   Pulse 85   Temp 98.4 °F (36.9 °C)   Resp 16   Ht 5' 9\" (1.753 m)   Wt 104.3 kg (229 lb 15 oz)   SpO2 93%   BMI 33.96 kg/m²     Current Shift:  No intake/output data recorded. Last three shifts:  07/03 1901 - 07/05 0700  In: 1011.7 [P.O.:960; I.V.:51.7]  Out: 0     Exam:    General: Elderly AA female, ill appearing, alert, NAD  Head/Neck: NCAT, supple, No masses, No lymphadenopathy  CVS:Regular rate and rhythm, no M/R/G, S1/S2 heard, no thrill  Lungs:Rales present bilaterally  Abdomen: Soft, Nontender, No distention, Normal Bowel sounds, No hepatomegaly  Extremities:3+ pitting edema BLE, pulses palpable 2+  Skin:normal texture and turgor, no rashes, no lesions  Neuro:grossly normal , follows commands  Psych:appropriate                Labs: Results:       Chemistry Recent Labs     07/05/19  0830 07/04/19  1616 07/04/19  0418 07/03/19  0808   *  --  122* 136*     --  137 139   K 3.3* 3.4* 3.1* 3.5   CL 91*  --  91* 92*   CO2 43*  --  43* 42*   BUN 42*  --  46* 51*   CREA 1.25  --  1.25 1.38*   CA 8.3*  --  8.2* 8.2*   AGAP 4  --  3 5   BUCR 34*  --  37* 37*      CBC w/Diff Recent Labs     07/05/19  0830 07/04/19  0418 07/03/19  0808   WBC 7.7 9.7 9.8   RBC 2.99* 2.75* 2.71*   HGB 8.9* 8.2* 8.2*   HCT 29.6* 27.3* 27.2*    130* 128*      Cardiac Enzymes No results for input(s): CPK, CKND1, MAGO in the last 72 hours.     No lab exists for component: CKRMB, TROIP   Coagulation No results for input(s): PTP, INR, APTT in the last 72 hours.    No lab exists for component: INREXT    Lipid Panel No results found for: CHOL, CHOLPOCT, CHOLX, CHLST, CHOLV, 374581, HDL, LDL, LDLC, DLDLP, 769285, VLDLC, VLDL, TGLX, TRIGL, TRIGP, TGLPOCT, CHHD, CHHDX   BNP No results for input(s): BNPP in the last 72 hours. Liver Enzymes No results for input(s): TP, ALB, TBIL, AP, SGOT, GPT in the last 72 hours.     No lab exists for component: DBIL   Thyroid Studies No results found for: T4, T3U, TSH, TSHEXT     Procedures/imaging: see electronic medical records for all procedures/Xrays and details which were not copied into this note but were reviewed prior to creation of [address], PA-C

## 2019-07-05 NOTE — PROGRESS NOTES
Cardiology Progress Note        Patient: Oni Herndon        Sex: female          DOA: 7/1/2019  YOB: 1945      Age:  69 y.o.        LOS:  LOS: 4 days   Assessment/Plan   Patient Feeling better than yesterday. Cr better today but increased BiCarb. Patient Active Problem List   Diagnosis Code    Elevated troponin R74.8    COPD exacerbation ([facility]) J44.1    Acute exacerbation of CHF (congestive heart failure) ([facility]) I50.9    Acute on chronic combined systolic and diastolic CHF, NYHA class 4 ([facility]) I50.43    Seizure disorder ([facility] [address] G40.909    History of CVA (cerebrovascular accident) Z80.78    GERD (gastroesophageal reflux disease) K21.9    Pacemaker Z95.0        Plan:  69 yrs old female with h/o CHF /COPD here with acute decompensated CHF. Acute decompensated CHF NYHA Class IV Stage D HF : Patient is feeling better with I/V Diuresis. Presently continues to be in low flow state with severely reduced LV function with LVEF = 15-20% with severe TR and Severe Pulmonary hypertension by the echo done today which I have reviewed. Milrinone was started yestarday. Feeling better. Recommend D/C Milrinone if tolerated. Continue lasix and Diamox. Palliative care to discuss with family poor prognosis and goal of care. COPD : Breathing is better continue treatment as per the hospitalist service. Subjective:     Patient is on nasal cannula at this time and is feeling slightly better. Still has lower extremity swelling      REVIEW OF SYSTEMS:     General: No fevers or chills. Cardiovascular: No chest pain,No palpitations, No orthopnea, No PND, No leg swelling, No claudication  Pulmonary: No shortness of breath.    Gastrointestinal: No nausea, vomiting, bleeding  Neurology: No Dizziness    Objective:      Visit Vitals  /76   Pulse 83   Temp 97.8 °F (36.6 °C)   Resp 16   Ht 5' 9\" (1.753 m)   Wt 104.3 kg (229 lb 15 oz)   SpO2 91%   BMI 33.96 kg/m²     Body mass index is 33.96 kg/m². Physical Exam:  General Appearance: Comfortable, not using accessory muscles of respiration. HEENT: WILTON. HEAD: Atraumatic  NECK:JVD present , no thyroidomeglay. CAROTIDS:  LUNGS: Clear bilaterally. HEART: S1+S2 audible,holosystolic murmur at the Lt sternal border, no pericardial rub. ABD: Non-tender, BS Audible    EXT: Edema +2, and no cyanosis. VASCULAR EXAM: Pulses are intact. PSYCHIATRIC EXAM: Mood is appropriate. MUSCULOSKELETAL: Grossly no joint deformity. NEUROLOGICAL: No motor and sensory deficit, Cranial nerves II-XII intact.   Medication:  Current Facility-Administered Medications   Medication Dose Route Frequency    potassium chloride (K-DUR, KLOR-CON) SR tablet 40 mEq  40 mEq Oral NOW    spironolactone (ALDACTONE) tablet 25 mg  25 mg Oral DAILY    acetaZOLAMIDE (DIAMOX) tablet 250 mg  250 mg Oral BID    furosemide (LASIX) injection 40 mg  40 mg IntraVENous DAILY    milrinone (PRIMACOR) 20 MG/100 ML D5W infusion  0.2 mcg/kg/min IntraVENous CONTINUOUS    aspirin chewable tablet 81 mg  81 mg Oral DAILY    atorvastatin (LIPITOR) tablet 80 mg  80 mg Oral QHS    carvedilol (COREG) tablet 3.125 mg  3.125 mg Oral BID WITH MEALS    apixaban (ELIQUIS) tablet 5 mg  5 mg Oral BID    gabapentin (NEURONTIN) capsule 300 mg  300 mg Oral TID    levETIRAcetam (KEPPRA) tablet 750 mg  750 mg Oral BID    lisinopril (PRINIVIL, ZESTRIL) tablet 2.5 mg  2.5 mg Oral DAILY    magnesium oxide (MAG-OX) tablet 400 mg  400 mg Oral BID    sucralfate (CARAFATE) tablet 1 g  1 g Oral DAILY    insulin lispro (HUMALOG) injection   SubCUTAneous AC&HS    glucose chewable tablet 16 g  4 Tab Oral PRN    glucagon (GLUCAGEN) injection 1 mg  1 mg IntraMUSCular PRN    levoFLOXacin (LEVAQUIN) 500 mg in D5W IVPB  500 mg IntraVENous Q24H    albuterol-ipratropium (DUO-NEB) 2.5 MG-0.5 MG/3 ML  3 mL Nebulization Q4H PRN               Lab/Data Reviewed:       Recent Labs     07/05/19  0830 07/04/19  0418 07/03/19  0808   WBC 7.7 9.7 9.8   HGB 8.9* 8.2* 8.2*   HCT 29.6* 27.3* 27.2*    130* 128*     Recent Labs     07/05/19  0830 07/04/19  1616 07/04/19 0418 07/03/19  0808     --  137 139   K 3.3* 3.4* 3.1* 3.5   CL 91*  --  91* 92*   CO2 43*  --  43* 42*   *  --  122* 136*   BUN 42*  --  46* 51*   CREA 1.25  --  1.25 1.38*   CA 8.3*  --  8.2* 8.2*       Signed By: Brooklyn Pabon MD     July 5, 2019

## 2019-07-05 NOTE — PROGRESS NOTES
[address] [phone] (COPE)    Patient Name: Zhou Gates  YOB: 1945    Reason for Consult: Goals of care  Requesting Provider: NOLAN Marie   Primary Care Physician: Vanessa Dorantes MD     SUMMARY:   Zhou Gates is a 69 y.o. with a past history of oxygen-dependent COPD and CHF , who was admitted on 2019 from [facility] with a diagnosis of CHF, Peripheral edema, Acute hypoxic respiratory insufficiency, Chronic obstructive pulmonary disease with acute exacerbation, Elevated troponin. Current medical issues leading to Palliative Medicine involvement include: Decline in condition. EF 15%. NOLAN Marie, PM team members Robert Zafar LCSW and myself met with   with patient's daughter Bhavana Chung and granddaughter Colonel Silver in conference area. Son Jr Sweet also present for portion of meeting. PA provided excellent medical overview of Mrs. Duarte current condition. Family then asked multiple questions which were answered to their satisfaction. We discussed option of comfort care and hospice involvement for symptom management. Family stated understanding. Diana Juarez acknowledged that her mother was not able to grasp full meaning of her condition but stated she was certain that her mother would want comfort care. She shared that her own son had recently  of cancer and had been briefly in hospice at the same nursing facility as mrs. Aric Christianson. \"She tried to die the day he did but they brought her back. She is ready to go be with him\". We then discussed artificial nutrition and feeding tube and PA explained reasons why this would not be recommended for mrs. Aric Christianson. Family stated understanding and agreed. Diana Larry stated she was MPOA but that her mother had never actually completed an Advance Directive. Explained that by [place], that Jr Sweet and Diana Juarez were equal decision makers.  Jr Sweet stated he preferred all decisions be made by Diana Juarez as \"I am on the road all the time and she is here\". He agreed with Elizabeth's decision for returning to NH with hospice care. DDNR and POST completed and signed by Drea Quach. Zackary aware and in agreement. They want to return to NH with hospice care for symptom management with no return to hospital and no feeding tubes. Copy of both documents placed in paper chart for scanning and original returned to Drea Quach. Updated bedside nurse and monitor tech. Palliative medicine team remains available for support. RECOMMENDATIONS:   1. DNR  2. Return to nursing facility with hospice services for symptom management     GOALS OF CARE / TREATMENT PREFERENCES:     GOALS OF CARE: Comfort  Patient/Health Care Proxy Stated Goals: Comfort    TREATMENT PREFERENCES:   Code Status: DNR    Advance Care Planning:    Advance Care Planning 7/2/2019   Confirm Advance Directive None     Patient has two children and no AMD on file, meaning that her children must agree on all decisions under the [employer]. Her children are Alphia Staff and Izora Poles . Lina Espinoza gave permission (witnessed by all present at meeting) for Alyne Letters to make all of patient's medical decisions and complete any necessary paperwork. Medical Interventions: Comfort measures PM Team explained the process, benefits, and burdens of CPR, intubation, and a feeding tube. Daughter Drea Quach signed DDNR and a POST form with the following measures: NO CPR, comfort measures, NO CPR. Copies placed in chart and originals provided to family.       Artificially Administered Nutrition: No feeding tube   CLINICAL ASSESSMENT:   Palliative Performance Scale (PPS):  PPS: 30    Modified ESAS Completed by: provider     Drowsiness: 4   Depression: 0 Pain: 0   Anxiety: 0 Nausea: 0   Anorexia: 0 Dyspnea: 0     Constipation: No     Stool Occurrence(s): 0     Clinical Pain Assessment (nonverbal scale for severity on nonverbal patients): Clinical Pain Assessment  Severity: 0     Activity (Movement): Lying quietly, normal position     PSYCHOSOCIAL/SPIRITUAL ASSESSMENT:   Palliative IDT has assessed this patient for cultural preferences / practices and a referral made as appropriate to needs (Cultural Services, Patient Advocacy, Ethics, etc.)  Has two children Alto Ege and Lesley Slight  Lives at     Any spiritual / [employer] concerns:  [] Yes /  [x] No    Caregiver Burnout:  [] Yes /  [x] No /  [] No Caregiver Present      Anticipatory grief assessment:   [x] Normal  / [] Maladaptive

## 2019-07-05 NOTE — ACP (ADVANCE CARE PLANNING)
[facility]: [phone] ([phone])  [facility]: [phone]    Time In: 1330  Time Out: 1404 [facility] Team ([address], RN, and this [facility]) as well as NOLAN Schwartz, met with patient's daughter Carissa Sauer and granddaughter Maral Echols in conference area. Son Oneida Boyd also present for portion of meeting. The reason for this palliative consult is establishment of goals of care and discussion of comfort care. Patient has EF of 15%. PA explained that patient qualifies for hospice services and that her condition is irreversible. Family reported that patient's goals, were she still able to make her own decisions, would be to be kept comfortable and not come back and forth to the hospital. Family was appropriately tearful and stated that their goal is to promote the best quality of life possible for patient. Patient has two children and no AMD on file, meaning that her children must agree on all decisions under the [employer]. Her children are Ronni Born and Rizwana Fam . Oneida Boyd gave permission (witnessed by all present at meeting) for Carissa Sauer to make all of patient's medical decisions and complete any necessary paperwork. PM Team explained the process, benefits, and burdens of CPR, intubation, and a feeding tube. Enriqueta Oliver signed DDNR and a POST form with the following measures: NO CPR, comfort measures, NO CPR. Copies placed in chart and originals provided to family. The family shared that patient's other son recently  of cancer on hospice services at home. They stated that this was a good experience. Patient resides in LTC at [facility] and the family stated that they plan for her to return there with hospice care. PA to update CM. Hospice consult to be placed. Patient status is DNR/DNI. PM Team to f/u as appropriate.

## 2019-07-05 NOTE — HOSPICE
Per notes, patient is returning to [facility],[address] with [facility] hospice provider.

## 2019-07-06 VITALS
RESPIRATION RATE: 17 BRPM | TEMPERATURE: 97.1 F | BODY MASS INDEX: 33.44 KG/M2 | HEIGHT: 69 IN | DIASTOLIC BLOOD PRESSURE: 55 MMHG | OXYGEN SATURATION: 95 % | SYSTOLIC BLOOD PRESSURE: 111 MMHG | HEART RATE: 92 BPM | WEIGHT: 225.75 LBS

## 2019-07-06 PROCEDURE — 77010033678 HC OXYGEN DAILY

## 2019-07-06 NOTE — PROGRESS NOTES
DC Plan: [facility] for LTC w [facility]    8618  Chart reviewed as CM on call. Attempted to call daughter to discuss hospice agency and unable to leave VM. No family at bedside. CM will cont to follow. Anticipate dc today. 56  Spoke with pts daughter Ms Bambi Halsted and they have chosen [facility], referral will be placed. Pt will dc today. Family will meet pt at facility around 12p. Unit secretary will arrange transport. Primary RN Chelsey made aware of dc plan. [address] with Nikunj Rivera @ [facility] and hospice referral received. Spoke with [facility] and he will page on call nurse. [phone] with on call nurse COREY @ [facility] [phone] and he is aware of referral, he was provided POA contact information and aware of dc time around 12p. This CM also spoke with [facility] and she is aware of dc, will fax dc summary to [phone]    Name Relation Home Work Rafa Maciel Child   [phone]       Transition of care to SNF: [facility]     Communication to Patient/Family:  Met with patient and family, and they are agreeable to the transition plan. SNF/Rehab Transition:  Patient has been accepted to [address] [place], [place] [address]. and meets criteria for admission. Patient will [facility] by [facility] and expected to leave at . Communication to SNF/Rehab:  Bedside RN, Chelsey, has been notified to update the transition plan to the facility and call report [phone]    Discharge information has been updated on the AVS and sent via [facility] and/or CC link. Discharge instructions to be fax'd to facility at [phone]     Please include all hard scripts for controlled substances, med rec and dc summary, and AVS in packet. Please medicate for pain prior to dc if possible and needed to help offset delay when patient first arrives to facility.     Reviewed and confirmed with facility, [facility] of [address] [place] [address], can manage the patient care needs for the following:     Gela Duval with (X) only those applicable:  Medication:  [x]Medications are available at the facility  []IV Antibiotics    []Controlled Substance  hard copies available sent. []Weekly Labs    Equipment:  []CPAP/BiPAP  []Wound Vacuum  []Hemphill or Urinary Device  []PICC/Central Line  []Nebulizer  []Ventilator    Treatment:  []Isolation (for MRSA, VRE, etc.)  []Surgical Drain Management  []Tracheostomy Care  []Dressing Changes  []Dialysis with transportation  []PEG Care  []Oxygen  []Daily Weights for Heart Failure    Dietary:  []Any diet limitations  []Tube Feedings   []Total Parenteral Management (TPN)    Financial Resources:  []Medicaid Application Completed  []UAI Completed  []Level II Completed  [] Private pay individual who will not become financially eligible for Medicaid within 6 months from admission to a [address] [place] [address] facility. [] Individual refused to have screening conducted. Advanced Care Plan:  []Surrogate Decision Maker of Care  [x]POA  []Communicated Code Status and copy sent.     Other:

## 2019-07-06 NOTE — PROGRESS NOTES
Bedside shift change report given to [address] [place] [address] (oncoming nurse) by Colten Rollins RN  (offgoing nurse). Report included the following information SBAR, Kardex, ED Summary, Intake/Output, MAR, Accordion, Recent Results and Med Rec Status. 3216 Shift assessment complete. 1245 Report called to [place] LPN  the [facility]. Shift Summary: Shift uneventful. No complaints of chest pain or shortness of breath. Call light is within reach.

## 2019-07-06 NOTE — PROGRESS NOTES
2000: Assumed care of patient. Patient resting in bed watching TV. VSS. No complaint of SOB or chest pain at this time. VSS. No needs expressed.

## 2019-07-06 NOTE — DISCHARGE SUMMARY
[facility] SUMMARY    Name:  Mohamud Whittaker  MR#:   [id number]  :  1945  ACCOUNT #:  [de-identified]  ADMIT DATE:  2019  DISCHARGE DATE:  2019    DISPOSITION:  The patient is going back to the facility, long-term care on comfort and hospice. The patient is a long-term care resident of the [facility]. DISCHARGE DIAGNOSES:  Include the following,  1. 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 2.  Chronic obstructive pulmonary disease exacerbation. 3.  Acute hypoxic respiratory failure due to #1 and #2.  4.  Hypertension. 5.  Gastroesophageal reflux disease. 6.  History of pacemaker. 7.  History of cerebrovascular accident. 8.  Chronic debility and weakness. 9.  Dementia. 10.  Paroxysmal atrial fibrillation. HOSPITAL SUMMARY:  This 79-year-old debilitated  female who lives at a nursing facility, came into the hospital with progressive shortness of breath consistent with congestive heart failure exacerbation requiring initially BiPAP therapy. She has a severely reduced ejection fraction of less than 15%. The family engaged in a discussion yesterday about goals of care and about transitioning into hospice. Per the patient's daughter, she felt like she would like to focus on comfort and avoid any other pain. They felt that hospice was a good plan for her in transitioning into comfort care, which they want to happen at the facility that she came from. Thus, arrangements were made yesterday for her to transition back with that. A post was completed and signed by the family yesterday and a durable DNR also. During her stay, the patient was placed on a milrinone drip by Cardiology due to her advanced congestive heart failure. She was diuresed. She had some improvement, also still persistent lower extremity edema.     Again, her EF is only 15%-20% with severe tricuspid regurgitation and severe pulmonary hypertension by the echo done here. Palliative Care was recommended by Cardiology due to the advanced nature of her congestive heart failure. She was treated for COPD exacerbation as well, on empiric antibiotics, given for possible pneumonia. Her most recent labs show an H and H of 8.9 and 29.6. White count is 7.7, platelet count is 807. Her last BUN and creatinine 42 and 1.25. Her potassium yesterday was 3.3. Sodium 138. There was chest x-ray performed on the admission day, which showed bilateral pleural effusions and bilateral atelectasis and air space disease. She has had no measured fever while here. PHYSICAL EXAMINATION:  GENERAL:  She is actually fairly comfortable. She is sitting looking at the menu this morning. She has had no respiratory distress. She has been transitioned to nasal cannula 2 liters oxygen. VITAL SIGNS:  Her blood pressure is 111/55, pulse 92, temp 97.1. LUNGS:  Diminished breath sounds at the bases. No rales noted. CARDIAC:  Irregular rhythm, regular rate. ABDOMEN:  Soft, nontender. LOWER EXTREMITIES:  3+ edema to the thighs bilaterally. PLAN:  Plan is to transition back on hospice-type care at the [facility] today. With that said, I do not think it is unreasonable to keep her on her Bumex dose as that would be reasonable to continue for comfort. Also, she is able to take her baby aspirin daily that would be fine. She was on Eliquis, which as we are going transitioning to comfort care, I would leave it to the facility if they feel like that is appropriate to give her, but I think her Keppra, her diuretic, and her sucralfate, that she was on prior for comfort would be appropriate. I am not going to prescribe any further antibiotics. We have prescribed morphine solution to take every 4 hours as needed for severe pain and Ativan solution to take every 4 hours due to her severe anxiety via hospice protocol. At this point, she has a DNR in place.     Recommendations are transition into comfort care there, medications as noted above, and with that said, she can move back to the facility today once arrangements are made for transportation. She is currently on a cardiac regular diet which she seems to be tolerating well, and I spent 35 minutes on her discharge time today. She has no medication allergies. Family has been made aware of her discharge time.       Morgan Reynolds MD      [id number]  D:  07/06/2019 9:03  T:  07/06/2019 9:14  JOB #:  [id number]

## 2019-07-10 ENCOUNTER — PATIENT OUTREACH (OUTPATIENT)
Dept: CASE MANAGEMENT | Age: 74
End: 2019-07-10

## 2019-07-10 NOTE — PROGRESS NOTES
Community Care Team Documentation for Patient in [facility]     Patient discharged from [address] to [address], on 7/6/19. Hospital Discharge diagnosis:       1. 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 2.  Chronic obstructive pulmonary disease exacerbation. 3.  Acute hypoxic respiratory failure due to #1 and #2.   4.  Hypertension. 5.  Gastroesophageal reflux disease. 6.  History of pacemaker. 7.  History of cerebrovascular accident. 8.  Chronic debility and weakness. 9.  Dementia. 10.  Paroxysmal atrial fibrillation. SNF Attending Provider:       Anticipated discharge date from SNF:  N/a , LTC Resident    PCP : Ever Cates MD    Nurse Navigator:     [facility] Team rounds completed, updates provided by facility. Brief Summary of Care:    Patient admitted with CHF, LTC Resident at [facility] AT [facility]. Weight stable since D/c   212.6 today. On Bumex. Will follow for MARICRUZ x 30 days. RRAT:  Medium Risk            13       Total Score        3 Has Seen PCP in Last 6 Months (Yes=3, No=0)    2 . Living with Significant Other. Assisted Living. LTAC. SNF. or   Rehab    8 Charlson Comorbidity Score (Age + Comorbid Conditions)        Criteria that do not apply:    Patient Length of Stay (>5 days = 3)    IP Visits Last 12 Months (1-3=4, 4=9, >4=11)    Pt.  Coverage (Medicare=5 , Medicaid, or Self-Pay=4)        Active Ambulatory Problems     Diagnosis Date Noted    Elevated troponin 07/01/2019    COPD exacerbation (HCC) 07/01/2019    Acute exacerbation of CHF (congestive heart failure) ([address] 07/01/2019    Acute on chronic combined systolic and diastolic CHF, NYHA class 4 ([address] 07/01/2019    Seizure disorder ([address] 07/01/2019    History of CVA (cerebrovascular accident) 07/01/2019    GERD (gastroesophageal reflux disease) 07/01/2019    Pacemaker 07/01/2019     Resolved Ambulatory Problems     Diagnosis Date Noted    No Resolved Ambulatory Problems     No Additional Past Medical History             Milana Lozada RN  [facility] Coordinator

## 2019-07-17 ENCOUNTER — PATIENT OUTREACH (OUTPATIENT)
Dept: CASE MANAGEMENT | Age: 74
End: 2019-07-17

## 2019-07-17 NOTE — PROGRESS NOTES
Community Care Team Documentation for Patient in [facility]     Patient discharged from [address] to [address], on 7/6/19. Hospital Discharge diagnosis:       1. 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 2.  Chronic obstructive pulmonary disease exacerbation. 3.  Acute hypoxic respiratory failure due to #1 and #2.   4.  Hypertension. 5.  Gastroesophageal reflux disease. 6.  History of pacemaker. 7.  History of cerebrovascular accident. 8.  Chronic debility and weakness. 9.  Dementia. 10.  Paroxysmal atrial fibrillation. SNF Attending Provider:       Anticipated discharge date from SNF:  N/a , LTC Resident    PCP : Ever Cates MD    Nurse Navigator:     [facility] Team rounds completed, updates provided by facility. Brief Summary of Care:    Patient admitted with CHF, LTC Resident at [facility] AT [facility]. Weight stable since D/c   On Bumex. Continues to do well. Will follow for MARICRUZ x 30 days. RRAT:  Medium Risk            13       Total Score        3 Has Seen PCP in Last 6 Months (Yes=3, No=0)    2 . Living with Significant Other. Assisted Living. LTAC. SNF. or   Rehab    8 Charlson Comorbidity Score (Age + Comorbid Conditions)        Criteria that do not apply:    Patient Length of Stay (>5 days = 3)    IP Visits Last 12 Months (1-3=4, 4=9, >4=11)    Pt.  Coverage (Medicare=5 , Medicaid, or Self-Pay=4)        Active Ambulatory Problems     Diagnosis Date Noted    Elevated troponin 07/01/2019    COPD exacerbation (HCC) 07/01/2019    Acute exacerbation of CHF (congestive heart failure) ([address] 07/01/2019    Acute on chronic combined systolic and diastolic CHF, NYHA class 4 ([address] 07/01/2019    Seizure disorder ([address] 07/01/2019    History of CVA (cerebrovascular accident) 07/01/2019    GERD (gastroesophageal reflux disease) 07/01/2019    Pacemaker 07/01/2019 Resolved Ambulatory Problems     Diagnosis Date Noted    No Resolved Ambulatory Problems     No Additional Past Medical History             Courtney Bolaños, [address]

## 2019-07-24 ENCOUNTER — PATIENT OUTREACH (OUTPATIENT)
Dept: CASE MANAGEMENT | Age: 74
End: 2019-07-24

## 2019-07-24 NOTE — PROGRESS NOTES
Community Care Team Documentation for Patient in [facility]     Patient discharged [facility] to [facility], on 7/6/19. Hospital Discharge diagnosis:       1. 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 2.  Chronic obstructive pulmonary disease exacerbation. 3.  Acute hypoxic respiratory failure due to #1 and #2.   4.  Hypertension. 5.  Gastroesophageal reflux disease. 6.  History of pacemaker. 7.  History of cerebrovascular accident. 8.  Chronic debility and weakness. 9.  Dementia. 10.  Paroxysmal atrial fibrillation. SNF Attending Provider:       Anticipated discharge date from SNF:  N/a , LTC Resident    PCP : Rudy Walls MD    Nurse Navigator:     [facility] Team rounds completed, updates provided by facility. Brief Summary of Care:    Patient admitted with CHF, LTC Resident at [facility] AT [facility]. Weight stable since D/c   On Bumex. Continues to do well. Family and patient requested Hospice consult - [facility] to meet with patient    Will follow for MARICRUZ x 30 days. RRAT:  Medium Risk            13       Total Score        3 Has Seen PCP in Last 6 Months (Yes=3, No=0)    2 . Living with Significant Other. Assisted Living. LTAC. SNF. or   Rehab    8 Charlson Comorbidity Score (Age + Comorbid Conditions)        Criteria that do not apply:    Patient Length of Stay (>5 days = 3)    IP Visits Last 12 Months (1-3=4, 4=9, >4=11)    Pt.  Coverage (Medicare=5 , Medicaid, or Self-Pay=4)        Active Ambulatory Problems     Diagnosis Date Noted    Elevated troponin 07/01/2019    COPD exacerbation ([facility] 07/01/2019    Acute exacerbation of CHF (congestive heart failure) ([address] 07/01/2019    Acute on chronic combined systolic and diastolic CHF, NYHA class 4 ([address] 07/01/2019    Seizure disorder ([facility] 07/01/2019    History of CVA (cerebrovascular accident) 07/01/2019    GERD (gastroesophageal reflux disease) 07/01/2019    Pacemaker 07/01/2019     Resolved Ambulatory Problems     Diagnosis Date Noted    No Resolved Ambulatory Problems     No Additional Past Medical History             Baljinder Gardner, [address]

## 2019-07-31 ENCOUNTER — PATIENT OUTREACH (OUTPATIENT)
Dept: CASE MANAGEMENT | Age: 74
End: 2019-07-31

## 2019-07-31 NOTE — PROGRESS NOTES
Community Care Team Documentation for Patient in [facility]     Patient discharged [place] [address] [place] [address] [address], on 7/6/19. Hospital Discharge diagnosis:       1. 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 2.  Chronic obstructive pulmonary disease exacerbation. 3.  Acute hypoxic respiratory failure due to #1 and #2.   4.  Hypertension. 5.  Gastroesophageal reflux disease. 6.  History of pacemaker. 7.  History of cerebrovascular accident. 8.  Chronic debility and weakness. 9.  Dementia. 10.  Paroxysmal atrial fibrillation. SNF Attending Provider:       Anticipated discharge date from SNF:  N/a , LTC Resident    PCP : Sari Vargas MD    Nurse Navigator:     [facility] Team rounds completed, updates provided by facility. Brief Summary of Care:    Patient admitted with CHF, LTC Resident at [facility]. Weight stable since D/c   On Bumex. Continues to do well. Family and patient requested Hospice consult - Patient and family agreeable to [address] [place] [address]. Will follow for MARICRUZ x 30 days. RRAT:  Medium Risk            13       Total Score        3 Has Seen PCP in Last 6 Months (Yes=3, No=0)    2 . Living with Significant Other. Assisted Living. LTAC. SNF. or   Rehab    8 Charlson Comorbidity Score (Age + Comorbid Conditions)        Criteria that do not apply:    Patient Length of Stay (>5 days = 3)    IP Visits Last 12 Months (1-3=4, 4=9, >4=11)    Pt.  Coverage (Medicare=5 , Medicaid, or Self-Pay=4)        Active Ambulatory Problems     Diagnosis Date Noted    Elevated troponin 07/01/2019    COPD exacerbation (HCC) 07/01/2019    Acute exacerbation of CHF (congestive heart failure) ([facility] 07/01/2019    Acute on chronic combined systolic and diastolic CHF, NYHA class 4 ([facility] 07/01/2019    Seizure disorder ([facility] 07/01/2019    History of CVA (cerebrovascular accident) 07/01/2019    GERD (gastroesophageal reflux disease) 07/01/2019    Pacemaker 07/01/2019     Resolved Ambulatory Problems     Diagnosis Date Noted    No Resolved Ambulatory Problems     No Additional Past Medical History             Kumar Ortiz, [address]

## 2019-08-14 ENCOUNTER — PATIENT OUTREACH (OUTPATIENT)
Dept: CASE MANAGEMENT | Age: 74
End: 2019-08-14

## 2019-08-16 NOTE — PROGRESS NOTES
Community Care Team Documentation for Patient in [facility]     Patient discharged from [address] to [address], on 19. Hospital Discharge diagnosis:       1. 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 2.  Chronic obstructive pulmonary disease exacerbation. 3.  Acute hypoxic respiratory failure due to #1 and #2.   4.  Hypertension. 5.  Gastroesophageal reflux disease. 6.  History of pacemaker. 7.  History of cerebrovascular accident. 8.  Chronic debility and weakness. 9.  Dementia. 10.  Paroxysmal atrial fibrillation. SNF Attending Provider:       Anticipated discharge date from SNF:  N/a , LTC Resident    PCP : Orlando Moreno MD    Nurse Navigator:     [facility] Team rounds completed, updates provided by facility. Brief Summary of Care:    Patient went home with [facility] and     Will follow for MARICRUZ x 30 days. RRAT:  Medium Risk            13       Total Score        3 Has Seen PCP in Last 6 Months (Yes=3, No=0)    2 . Living with Significant Other. Assisted Living. LTAC. SNF. or   Rehab    8 Charlson Comorbidity Score (Age + Comorbid Conditions)        Criteria that do not apply:    Patient Length of Stay (>5 days = 3)    IP Visits Last 12 Months (1-3=4, 4=9, >4=11)    Pt.  Coverage (Medicare=5 , Medicaid, or Self-Pay=4)        Active Ambulatory Problems     Diagnosis Date Noted    Elevated troponin 2019    COPD exacerbation (HCC) 2019    Acute exacerbation of CHF (congestive heart failure) ([address] 2019    Acute on chronic combined systolic and diastolic CHF, NYHA class 4 ([address] 2019    Seizure disorder ([address] 2019    History of CVA (cerebrovascular accident) 2019    GERD (gastroesophageal reflux disease) 2019    Pacemaker 2019     Resolved Ambulatory Problems     Diagnosis Date Noted    No Resolved Ambulatory Problems     No Additional Past Medical History             Rubi Altman, RN  Community Care